# Patient Record
Sex: MALE | Race: WHITE | NOT HISPANIC OR LATINO | Employment: OTHER | URBAN - NONMETROPOLITAN AREA
[De-identification: names, ages, dates, MRNs, and addresses within clinical notes are randomized per-mention and may not be internally consistent; named-entity substitution may affect disease eponyms.]

---

## 2017-01-02 ENCOUNTER — AMBULATORY - GICH (OUTPATIENT)
Dept: UROLOGY | Facility: OTHER | Age: 37
End: 2017-01-02

## 2017-01-02 DIAGNOSIS — E29.1 TESTICULAR HYPOFUNCTION: ICD-10-CM

## 2017-01-26 ENCOUNTER — OFFICE VISIT - GICH (OUTPATIENT)
Dept: UROLOGY | Facility: OTHER | Age: 37
End: 2017-01-26

## 2017-01-26 ENCOUNTER — HISTORY (OUTPATIENT)
Dept: UROLOGY | Facility: OTHER | Age: 37
End: 2017-01-26

## 2017-01-26 DIAGNOSIS — E29.1 TESTICULAR HYPOFUNCTION: ICD-10-CM

## 2017-02-07 ENCOUNTER — COMMUNICATION - GICH (OUTPATIENT)
Dept: FAMILY MEDICINE | Facility: OTHER | Age: 37
End: 2017-02-07

## 2017-03-03 ENCOUNTER — OFFICE VISIT - GICH (OUTPATIENT)
Dept: FAMILY MEDICINE | Facility: OTHER | Age: 37
End: 2017-03-03

## 2017-03-03 ENCOUNTER — HISTORY (OUTPATIENT)
Dept: FAMILY MEDICINE | Facility: OTHER | Age: 37
End: 2017-03-03

## 2017-03-03 DIAGNOSIS — R10.30 LOWER ABDOMINAL PAIN: ICD-10-CM

## 2017-03-03 DIAGNOSIS — J01.00 ACUTE MAXILLARY SINUSITIS: ICD-10-CM

## 2017-03-03 ASSESSMENT — PATIENT HEALTH QUESTIONNAIRE - PHQ9: SUM OF ALL RESPONSES TO PHQ QUESTIONS 1-9: 0

## 2017-03-13 ENCOUNTER — AMBULATORY - GICH (OUTPATIENT)
Dept: UROLOGY | Facility: OTHER | Age: 37
End: 2017-03-13

## 2017-03-13 DIAGNOSIS — E29.1 TESTICULAR HYPOFUNCTION: ICD-10-CM

## 2017-03-27 ENCOUNTER — HISTORY (OUTPATIENT)
Dept: EMERGENCY MEDICINE | Facility: OTHER | Age: 37
End: 2017-03-27

## 2017-04-06 ENCOUNTER — HISTORY (OUTPATIENT)
Dept: UROLOGY | Facility: OTHER | Age: 37
End: 2017-04-06

## 2017-04-06 ENCOUNTER — OFFICE VISIT - GICH (OUTPATIENT)
Dept: UROLOGY | Facility: OTHER | Age: 37
End: 2017-04-06

## 2017-04-06 DIAGNOSIS — E29.1 TESTICULAR HYPOFUNCTION: ICD-10-CM

## 2017-04-13 ENCOUNTER — AMBULATORY - GICH (OUTPATIENT)
Dept: UROLOGY | Facility: OTHER | Age: 37
End: 2017-04-13

## 2017-04-13 DIAGNOSIS — E29.1 TESTICULAR HYPOFUNCTION: ICD-10-CM

## 2017-06-15 ENCOUNTER — OFFICE VISIT - GICH (OUTPATIENT)
Dept: UROLOGY | Facility: OTHER | Age: 37
End: 2017-06-15

## 2017-06-15 ENCOUNTER — HISTORY (OUTPATIENT)
Dept: UROLOGY | Facility: OTHER | Age: 37
End: 2017-06-15

## 2017-06-15 DIAGNOSIS — E29.1 TESTICULAR HYPOFUNCTION: ICD-10-CM

## 2017-07-25 ENCOUNTER — AMBULATORY - GICH (OUTPATIENT)
Dept: UROLOGY | Facility: OTHER | Age: 37
End: 2017-07-25

## 2017-07-25 DIAGNOSIS — E29.1 TESTICULAR HYPOFUNCTION: ICD-10-CM

## 2017-08-10 ENCOUNTER — AMBULATORY - GICH (OUTPATIENT)
Dept: LAB | Facility: OTHER | Age: 37
End: 2017-08-10

## 2017-08-10 DIAGNOSIS — E29.1 TESTICULAR HYPOFUNCTION: ICD-10-CM

## 2017-08-10 LAB
HCT VFR BLD AUTO: 50.3 % (ref 37–53)
TESTOST SERPL-MCNC: 250.3 NG/DL (ref 175–781)

## 2017-08-24 ENCOUNTER — AMBULATORY - GICH (OUTPATIENT)
Dept: SCHEDULING | Facility: OTHER | Age: 37
End: 2017-08-24

## 2017-08-30 ENCOUNTER — HISTORY (OUTPATIENT)
Dept: UROLOGY | Facility: OTHER | Age: 37
End: 2017-08-30

## 2017-08-30 ENCOUNTER — OFFICE VISIT - GICH (OUTPATIENT)
Dept: UROLOGY | Facility: OTHER | Age: 37
End: 2017-08-30

## 2017-08-30 DIAGNOSIS — E29.1 TESTICULAR HYPOFUNCTION: ICD-10-CM

## 2017-09-22 ENCOUNTER — OFFICE VISIT - GICH (OUTPATIENT)
Dept: FAMILY MEDICINE | Facility: OTHER | Age: 37
End: 2017-09-22

## 2017-09-22 ENCOUNTER — HISTORY (OUTPATIENT)
Dept: FAMILY MEDICINE | Facility: OTHER | Age: 37
End: 2017-09-22

## 2017-09-22 DIAGNOSIS — H60.92 OTITIS EXTERNA OF LEFT EAR: ICD-10-CM

## 2017-09-22 ASSESSMENT — PATIENT HEALTH QUESTIONNAIRE - PHQ9: SUM OF ALL RESPONSES TO PHQ QUESTIONS 1-9: 0

## 2017-10-12 ENCOUNTER — AMBULATORY - GICH (OUTPATIENT)
Dept: UROLOGY | Facility: OTHER | Age: 37
End: 2017-10-12

## 2017-10-12 DIAGNOSIS — E29.1 TESTICULAR HYPOFUNCTION: ICD-10-CM

## 2017-11-09 ENCOUNTER — HISTORY (OUTPATIENT)
Dept: UROLOGY | Facility: OTHER | Age: 37
End: 2017-11-09

## 2017-11-09 ENCOUNTER — OFFICE VISIT - GICH (OUTPATIENT)
Dept: UROLOGY | Facility: OTHER | Age: 37
End: 2017-11-09

## 2017-11-09 DIAGNOSIS — E29.1 TESTICULAR HYPOFUNCTION: ICD-10-CM

## 2017-11-09 LAB — TESTOST SERPL-MCNC: 181.1 NG/DL (ref 175–781)

## 2017-12-11 ENCOUNTER — AMBULATORY - GICH (OUTPATIENT)
Dept: UROLOGY | Facility: OTHER | Age: 37
End: 2017-12-11

## 2017-12-11 DIAGNOSIS — E29.1 TESTICULAR HYPOFUNCTION: ICD-10-CM

## 2017-12-15 ENCOUNTER — OFFICE VISIT - GICH (OUTPATIENT)
Dept: FAMILY MEDICINE | Facility: OTHER | Age: 37
End: 2017-12-15

## 2017-12-15 ENCOUNTER — HISTORY (OUTPATIENT)
Dept: FAMILY MEDICINE | Facility: OTHER | Age: 37
End: 2017-12-15

## 2017-12-15 DIAGNOSIS — J02.0 STREPTOCOCCAL PHARYNGITIS: ICD-10-CM

## 2017-12-15 DIAGNOSIS — J02.9 ACUTE PHARYNGITIS: ICD-10-CM

## 2017-12-15 LAB — STREP A ANTIGEN - HISTORICAL: POSITIVE

## 2017-12-27 NOTE — PROGRESS NOTES
Patient Information     Patient Name MRN Julio Guzman [id number] Male 1980      Progress Notes by Raz Cabrera MD at 6/15/2017  8:45 AM     Author:  Raz Cabrera MD Service:  (none) Author Type:  Physician     Filed:  6/15/2017  9:44 AM Encounter Date:  6/15/2017 Status:  Signed     :  Raz Cabrera MD (Physician)            IType of Visit  EST    Chief Complaint  Hypogonadism    HPI  Mr. Trent is a 36 y.o. male who follows up with hypogonadism.  Primary symptoms include low energy which started 3 years ago.  Has previously was on short acting T injections.  He was on 200mg every 2 weeks but was titrated up to 200mg every 2-3 days.  His levels were significantly supratherapeutic.  About three years ago he was using Axiron.  He presents today for his fourth injection of Aveed.    Overall he feels well.  He is complaining of difficulty with weight loss.  Also complaining of significant weight gain the last 2-3 years.  He is asking for medications to help with weight loss and cut fat.  He denies breast swelling or tenderness and acne.      Review of Systems  I personally reviewed the ROS with the patient.    Nursing Notes:   Erinn Glynn  6/15/2017  9:05 AM  Signed  Patient presents to the clinic for follow up and Aveed injection.  Erinn Glynn LPN........................6/15/2017  8:56 AM    Review of Systems:    Weight loss:    No     Recent fever/chills:  No   Night sweats:   No  Current skin rash:  No   Recent hair loss:  No  Heat intolerance:  No   Cold intolerance:  yes  Chest pain:   No   Palpitations:   No  Shortness of breath:  No   Wheezing:   No  Constipation:    No   Diarrhea:   No   Nausea:   No   Vomiting:   No   Kidney/side pain:  No   Back pain:   No  Frequent headaches:  No   Dizziness:     No  Leg swelling:   No   Calf pain:    No    Erinn Glynn LPN........................6/15/2017  8:56 AM        Physical Exam  Vitals:     06/15/17 0857   BP: 146/74   Pulse: 80    Weight: 108.9 kg (240 lb)      Constitutional: No acute distress.  Alert and cooperative   Head: NCAT  Eyes: Conjunctivae normal  Cardiovascular: Regular rate.  Pulmonary/Chest: Respirations are even and non-labored bilaterally, no audible wheezing  Abdominal: Soft. No distension, tenderness, masses or guarding.   Neurological: A + O x 3.  Cranial Nerves II-XII grossly intact.  Extremities: BEST x 4, Warm. No clubbing.  No cyanosis.    Skin: Pink, warm and dry.  No visible rashes noted.  Psychiatric:  Normal mood and affect  Back:  No left CVA tenderness.  No right CVA tenderness.  Genitourinary:  Nonpalpable bladder    Labs  Results for KENNETH TRENT (MRN [id number]) as of 12/19/2016 08:35   2/3/2016 17:06 12/14/2016 16:27   TSH 1.27    VITAMIN D TOTAL AFL 21.2    TESTOSTERONE FREE 74.00 (H) 4.49 (L)   TESTOSTERONE,TOTAL 2000 (H) 136 (L)       CREATININE (mg/dL)    Date Value   12/14/2016 1.09       Testosterone Injection  Today the patient received an injection of testosterone undecanoate 750mg.  This was given in the gluteus.  The results of this injection: None  Patient was monitored for 30 minutes following the injection.    Assessment  Mr. Trent is a 36 y.o. male with clinically symptomatic hypogonadism.  He is continuing Aveed.  He accidentally missed his lab appointment two weeks ago- Will need to reschedule    Plan  Aveed 750mg IM every 10 weeks   -Will again plan for labs (T and HCT) in 8 weeks (lab only)

## 2017-12-28 NOTE — PROGRESS NOTES
Patient Information     Patient Name MRN Julio Guzman [id number] Male 1980      Progress Notes by Raz Cabrera MD at 2017  8:45 AM     Author:  Raz Cabrera MD Service:  (none) Author Type:  Physician     Filed:  2017 12:56 PM Encounter Date:  2017 Status:  Signed     :  Raz Cabrera MD (Physician)            IType of Visit  EST    Chief Complaint  Hypogonadism    HPI  Mr. Trent is a 37 y.o. male who follows up with hypogonadism.  Primary symptoms include low energy which started 3 years ago.  Has previously was on short acting T injections.  He was on 200mg every 2 weeks but was titrated up to 200mg every 2-3 days.  His levels were significantly supratherapeutic.  He also previously failed Axiron.  He presents today for an injection of Aveed.  He is doing well with this form of replacement for the first 7-8 weeks of the 10 week course.  He does complain of significant return of symptoms of low energy and low libido the last 2-3 weeks of the cycle.  He denies side effects such as acne or mood swings.      Review of Systems  I personally reviewed the ROS with the patient.    Nursing Notes:   Erinn Glynn  2017  9:14 AM  Signed  Patient presents to the clinic for follow up aveed injection.  Erinn Glynn LPN........................2017  8:56 AM    Review of Systems:    Weight loss:    No     Recent fever/chills:  No   Night sweats:   No  Current skin rash:  No   Recent hair loss:  No  Heat intolerance:  yes   Cold intolerance:  yes  Chest pain:   No   Palpitations:   No  Shortness of breath:  No   Wheezing:   No  Constipation:    No   Diarrhea:   No   Nausea:   No   Vomiting:   No   Kidney/side pain:  No   Back pain:   No  Frequent headaches:  No   Dizziness:     No  Leg swelling:   yes   Calf pain:    No        Physical Exam  Vitals:     17 0857   BP: 126/64   Pulse: 72   Weight: 107.4 kg (236 lb 12.8 oz)      Constitutional: No acute distress.  Alert  and cooperative   Head: NCAT  Eyes: Conjunctivae normal  Cardiovascular: Regular rate.  Pulmonary/Chest: Respirations are even and non-labored bilaterally, no audible wheezing  Abdominal: Soft. No distension, tenderness, masses or guarding.   Neurological: A + O x 3.  Cranial Nerves II-XII grossly intact.  Extremities: BEST x 4, Warm. No clubbing.  No cyanosis.    Skin: Pink, warm and dry.  No visible rashes noted.  Psychiatric:  Normal mood and affect  Back:  No left CVA tenderness.  No right CVA tenderness.  Genitourinary:  Nonpalpable bladder    Labs  Results for KENNETH TRENT (MRN [id number]) as of 12/19/2016 08:35   2/3/2016 17:06 12/14/2016 16:27   TSH 1.27    VITAMIN D TOTAL AFL 21.2    TESTOSTERONE FREE 74.00 (H) 4.49 (L)   TESTOSTERONE,TOTAL 2000 (H) 136 (L)     Results for KENNETH TRENT (MRN [id number]) as of 8/30/2017 09:18   8/10/2017 08:17   HEMATOCRIT                50.3   TESTOSTERONE,TOTAL 250.3     CREATININE (mg/dL)    Date Value   12/14/2016 1.09       Testosterone Injection  Today the patient received an injection of testosterone undecanoate 750mg.  This was given in the gluteus.  The results of this injection: None  Patient was monitored for 30 minutes following the injection.    Assessment  Mr. Trent is a 37 y.o. male with clinically symptomatic hypogonadism.  He is continuing Aveed.  He accidentally missed his lab appointment two weeks ago- Will need to reschedule    Plan  Aveed 750mg IM every 10 weeks   -Will again plan for labs (T and HCT) in 18 weeks (lab only)

## 2017-12-28 NOTE — PROGRESS NOTES
"Patient Information     Patient Name MRN Sex Julio Morton [id number] Male 1980      Progress Notes by Samantha Gonzalez MD at 2017  3:00 PM     Author:  Samantha Gonzalez MD Service:  (none) Author Type:  Physician     Filed:  2017  5:00 PM Encounter Date:  2017 Status:  Signed     :  Samantha Gonzalez MD (Physician)            SUBJECTIVE:    Julio Trent is a 37 y.o. male who presents for ear pain.    HPI Comments: Patient presents with several days of left ear pain. Pain is quite severe. Last week he had a few days of right ear pain that then resolved. He has been swimming more often with his kids. He currently feels mildly nauseous. No fever but does have some neck discomfort, generally just does not feel well.    REVIEW OF SYSTEMS:  ROS see history of present illness, review of systems otherwise negative.    OBJECTIVE:  /80  Pulse 72  Temp 97.7  F (36.5  C) (Tympanic)  Ht 1.8 m (5' 10.87\")  Wt 102.5 kg (226 lb)  BMI 31.64 kg/m2    EXAM:   Physical Exam   Constitutional: He is well-developed, well-nourished, and in no distress.   HENT:   Left canal red and swollen and irritated, TM that can be visualized appears normal. Right TM is normal. No cervical lymphadenopathy. Patient does have some discomfort with palpation over the left anterior cervical lymph nodes and submandibular area. No masses felt in this area. No nuchal rigidity.   Eyes: Conjunctivae are normal.   Neck: Neck supple.   Cardiovascular: Normal rate and regular rhythm.    Pulmonary/Chest: Effort normal and breath sounds normal.   Abdominal: Soft. There is no tenderness.   Neurological: He is alert.   Skin: No rash noted.   Psychiatric: Mood normal.       ASSESSMENT/PLAN:    ICD-10-CM    1. Otitis externa of left ear, unspecified chronicity, unspecified type H60.92 ciprofloxacin-dexamethasone (CIPRODEX) otic suspension      clindamycin (CLEOCIN) 300 mg capsule        Plan:  Discussed with patient that current " findings are most consistent with otitis externa. Due to recurrent issues recently, would recommend treatment with drying drops following swimming in the future. For now will treat with ciprofloxacin-dexamethasone otic suspension. Otitis externa, however, does not explain his other more systemic symptoms. We did discuss the possibility of an early dental or other infection which may be causing him to feel generally poor and cause nausea. The patient thinks some of this could be due to the recent diet that he is on, he has recently lost a significant amount of weight and has not been eating much. I opted to give him a prescription for clindamycin which he will hold onto for now. He will have this filled and start taking it if his other systemic symptoms progress, if he develops a fever, if he has a headache or other discomfort. If he has additional concerns she is encouraged to follow up over the weekend.      Samantha Gonzalez MD

## 2017-12-28 NOTE — PROGRESS NOTES
Patient Information     Patient Name MRN Julio Guzman [id number] Male 1980      Progress Notes by Raz Cabrera MD at 2017 11:30 AM     Author:  Raz Cabrera MD Service:  (none) Author Type:  Physician     Filed:  2017 12:43 PM Encounter Date:  2017 Status:  Signed     :  Raz Cabrera MD (Physician)            IType of Visit  EST    Chief Complaint  Hypogonadism    HPI  Mr. Trent is a 37 y.o. male who follows up with hypogonadism.  Primary symptoms include low energy which started over 3 years ago.  Has previously was on short acting T injections.  He was on 200mg every 2 weeks but was titrated up to 200mg every 2-3 days.  His levels were significantly supratherapeutic.  He also previously failed Axiron.  He presents today for an injection of Aveed.  He again states that he does exceptionally well for about 7-8 weeks out of the 10 week cycle.  The final 2 weeks he describes significant issues with low energy and low libido.  He is very satisfied with the first 8 weeks of symptomatic improvement.    He again denies side effects such as acne or mood swings.  He is asking if he can change the frequency of injection from every 10 weeks to every 8 weeks.      Review of Systems  I personally reviewed the ROS with the patient.    Nursing Notes:   Cadnie Barragan  2017 11:38 AM  Signed  Here for Aveed injection.  Review of Systems:    Weight loss:    Yes     Recent fever/chills:  No   Night sweats:   No  Current skin rash:  No   Recent hair loss:  No  Heat intolerance:  No   Cold intolerance:  Yes  Chest pain:   No   Palpitations:   No  Shortness of breath:  No   Wheezing:   No  Constipation:    No   Diarrhea:   No   Nausea:   No   Vomiting:   No   Kidney/side pain:  No   Back pain:   No  Frequent headaches:  No   Dizziness:     No  Leg swelling:   No   Calf pain:    No      Parents, brothers or sisters with history of kidney cancer?   No  Parents, brothers or sisters with  history of bladder cancer: No  Candie Barragan LIZAN  11/9/2017  11:24 AM            Physical Exam  Vitals:     11/09/17 1122   BP: 110/60   Resp: 16   Weight: 99.3 kg (219 lb)      Constitutional: No acute distress.  Alert and cooperative   Head: NCAT  Eyes: Conjunctivae normal  Cardiovascular: Regular rate.  Pulmonary/Chest: Respirations are even and non-labored bilaterally, no audible wheezing  Abdominal: Soft. No distension, tenderness, masses or guarding.   Neurological: A + O x 3.  Cranial Nerves II-XII grossly intact.  Extremities: BEST x 4, Warm. No clubbing.  No cyanosis.    Skin: Pink, warm and dry.  No visible rashes noted.  Psychiatric:  Normal mood and affect  Back:  No left CVA tenderness.  No right CVA tenderness.  Genitourinary:  Nonpalpable bladder    LabsResults for KENNETH TRENT (MRN [id number]) as of 11/9/2017 12:39   8/10/2017 08:17 11/9/2017 11:40   TESTOSTERONE,TOTAL 250.3 181.1       Results for KENNETH TRENT (MRN [id number]) as of 12/19/2016 08:35   2/3/2016 17:06 12/14/2016 16:27   TSH 1.27    VITAMIN D TOTAL AFL 21.2    TESTOSTERONE FREE 74.00 (H) 4.49 (L)   TESTOSTERONE,TOTAL 2000 (H) 136 (L)       CREATININE (mg/dL)    Date Value   12/14/2016 1.09       Testosterone Injection  Today the patient received an injection of testosterone undecanoate 750mg.  This was given in the gluteus.  The results of this injection: None  Patient was monitored for 30 minutes following the injection.    Assessment  Mr. Trent is a 37 y.o. male with clinically symptomatic hypogonadism.  He is doing very well with AVEED however he is having issues with the final 2 weeks of the 10 week course.  His labs today would support the fact that he is subtherapeutic the final 2 weeks both based on serum levels and his clinical symptoms.  I explained that changing the frequency to every 8 weeks would be quite reasonable and that we will attempt this for the next injection.    Plan  Aveed 750mg IM every 8 weeks (need to  prior auth this change in frequency)

## 2017-12-30 NOTE — NURSING NOTE
Patient Information     Patient Name MRN Julio Guzman [id number] Male 1980      Nursing Note by Salina Fuller at 2017  3:00 PM     Author:  Salina Fuller Service:  (none) Author Type:  (none)     Filed:  2017  3:07 PM Encounter Date:  2017 Status:  Signed     :  Salina Fullercheng Trent is a 37 y.o. male presenting with left ear pain.   Salina Fuller LPN 2017 3:00 PM

## 2017-12-30 NOTE — NURSING NOTE
Patient Information     Patient Name MRN Julio Guzman [id number] Male 1980      Nursing Note by Erinn Glynn at 2017  8:45 AM     Author:  Erinn Glynn Service:  (none) Author Type:  (none)     Filed:  2017  9:14 AM Encounter Date:  2017 Status:  Signed     :  Erinn Glynn            Patient presents to the clinic for follow up aveed injection.  Erinn Glynn LPN........................2017  8:56 AM    Review of Systems:    Weight loss:    No     Recent fever/chills:  No   Night sweats:   No  Current skin rash:  No   Recent hair loss:  No  Heat intolerance:  yes   Cold intolerance:  yes  Chest pain:   No   Palpitations:   No  Shortness of breath:  No   Wheezing:   No  Constipation:    No   Diarrhea:   No   Nausea:   No   Vomiting:   No   Kidney/side pain:  No   Back pain:   No  Frequent headaches:  No   Dizziness:     No  Leg swelling:   yes   Calf pain:    No

## 2017-12-30 NOTE — NURSING NOTE
Patient Information     Patient Name MRN Julio Guzman [id number] Male 1980      Nursing Note by Erinn Glynn at 6/15/2017  8:45 AM     Author:  Erinn Glynn Service:  (none) Author Type:  (none)     Filed:  6/15/2017  9:05 AM Encounter Date:  6/15/2017 Status:  Signed     :  Erinn Glynn            Patient presents to the clinic for follow up and Aveed injection.  Erinn Glynn LPN........................6/15/2017  8:56 AM    Review of Systems:    Weight loss:    No     Recent fever/chills:  No   Night sweats:   No  Current skin rash:  No   Recent hair loss:  No  Heat intolerance:  No   Cold intolerance:  yes  Chest pain:   No   Palpitations:   No  Shortness of breath:  No   Wheezing:   No  Constipation:    No   Diarrhea:   No   Nausea:   No   Vomiting:   No   Kidney/side pain:  No   Back pain:   No  Frequent headaches:  No   Dizziness:     No  Leg swelling:   No   Calf pain:    No    Erinn Glynn LPN........................6/15/2017  8:56 AM

## 2017-12-30 NOTE — NURSING NOTE
Patient Information     Patient Name MRN Julio Guzman [id number] Male 1980      Nursing Note by Candie Barragan at 2017 11:30 AM     Author:  Candie Barragan Service:  (none) Author Type:  (none)     Filed:  2017 11:38 AM Encounter Date:  2017 Status:  Signed     :  Candie Barragan            Here for Aveed injection.  Review of Systems:    Weight loss:    Yes     Recent fever/chills:  No   Night sweats:   No  Current skin rash:  No   Recent hair loss:  No  Heat intolerance:  No   Cold intolerance:  Yes  Chest pain:   No   Palpitations:   No  Shortness of breath:  No   Wheezing:   No  Constipation:    No   Diarrhea:   No   Nausea:   No   Vomiting:   No   Kidney/side pain:  No   Back pain:   No  Frequent headaches:  No   Dizziness:     No  Leg swelling:   No   Calf pain:    No      Parents, brothers or sisters with history of kidney cancer?   No  Parents, brothers or sisters with history of bladder cancer: No  Candie Barragan LPN  2017  11:24 AM

## 2017-12-30 NOTE — NURSING NOTE
"Patient Information     Patient Name MRN Julio Guzman [id number] Male 1980      Nursing Note by Vida Huerta RN at 6/15/2017  8:45 AM     Author:  Vida Huerta RN Service:  (none) Author Type:  NURS- Registered Nurse     Filed:  6/15/2017 10:05 AM Encounter Date:  6/15/2017 Status:  Signed     :  Vida Huerta RN (NURS- Registered Nurse)            Patient given \"Aveed Treatment: A Patient Guide\" form.  Injection given and patient in clinic for 30 minutes after injection for monitoring.  Patient tolerated injection with no problems.  Vida Huerta RN.........6/15/2017...10:05 AM        "

## 2018-01-03 NOTE — NURSING NOTE
Patient Information     Patient Name MRN Julio Guzman [id number] Male 1980      Nursing Note by Erinn Glynn at 3/3/2017  4:00 PM     Author:  Erinn Glynn Service:  (none) Author Type:  (none)     Filed:  3/3/2017  3:36 PM Encounter Date:  3/3/2017 Status:  Signed     :  Erinn Glynn            Patient presents to the clinic for sore throat, sinus pain and pressure.  Patient states that this has been going on for three weeks.    Erinn Glynn LPN........................3/3/2017  3:27 PM

## 2018-01-03 NOTE — TELEPHONE ENCOUNTER
Patient Information     Patient Name MRN Sex Julio Morton [id number] Male 1980      Telephone Encounter by Estee Joseph at 2017  2:41 PM     Author:  Estee Joseph Service:  (none) Author Type:  (none)     Filed:  2017  2:42 PM Encounter Date:  2017 Status:  Signed     :  Estee Joseph.  Do we need to contact the [place] [facility]??  This was forwarded to [facility].  Please advise.  Estee Joseph LPN ....................  2017   2:42 PM.

## 2018-01-03 NOTE — NURSING NOTE
Patient Information     Patient Name MRN Julio Guzman [id number] Male 1980      Nursing Note by Candie Barragan at 2017  8:30 AM     Author:  Candie Barragan Service:  (none) Author Type:  (none)     Filed:  2017  9:41 AM Encounter Date:  2017 Status:  Signed     :  Candie Barragan            Here for Aveed injection.  Review of Systems:    Weight loss:    Yes     Recent fever/chills:  No   Night sweats:   No  Current skin rash:  No   Recent hair loss:  No  Heat intolerance:  No   Cold intolerance:  Yes  Chest pain:   No   Palpitations:   No  Shortness of breath:  No   Wheezing:   No  Constipation:    No   Diarrhea:   Yes   Nausea:   No   Vomiting:   No   Kidney/side pain:  No   Back pain:   No  Frequent headaches:  No   Dizziness:     No  Leg swelling:   No   Calf pain:    No    Candie Barragan LPN  2017  8:42 AM

## 2018-01-03 NOTE — PATIENT INSTRUCTIONS
Patient Information     Patient Name MRN Julio Guzman [id number] Male 1980      Patient Instructions by Rosa Maria Davis PA-C at 3/3/2017  4:00 PM     Author:  Rosa Maria Davis PA-C Service:  (none) Author Type:  PHYS- Physician Assistant     Filed:  3/3/2017  3:52 PM Encounter Date:  3/3/2017 Status:  Signed     :  Rosa Maria Davis PA-C (PHYS- Physician Assistant)            Groin pain -   Encouraged to take ibuprofen for relief up to 4 times per day.  Encouraged rest and elevation.  Encouraged to use ice or heat 15 minutes at a time several times per day to decrease pain. Return to clinic in 1-2 weeks as necessary for persistent pain. Return to clinic with any change or worsening of symptoms.  Referred to PT.   Encouraged stretching.       Sinus infection - Antibiotic has been sent to pharmacy. Please take full course of antibiotic even if symptoms have completely resolved. This helps prevent against antibiotic resistance.     May use symptomatic care with tylenol or ibuprofen. May use cough syrup or cough drops.  Using a humidifier works well to break up the congestion. You can also sleep propped up on a couple pillows to decrease symptoms at night.     Use a Neti Pot/sinus flush (Melchor Med Sinus Rinse) 3 times daily to irrigate sinuses/mucosal tissue.     Sudafed or mucinex work well for congestion.   If you choose pseudoephedrine, use for only 5-7 days AS DIRECTED. Speak to your pharmacist if you have any concerns about your medications. May also use decongestant nasal spray, but only for 3 days MAXIMUM.    Please take tylenol as needed up to 4 times daily.  Treat symptomatically with warm salt water gargles.  Lozenges, Tylenol, Advil or Aleve as needed. Frequent swallows of cool liquid.  Oatmeal coats the throat and some patients find it soothes the pain.     Monitor for any fevers or chills. Return in 7-10 days if not feeling better. Please call clinic with any questions or concerns.  Please take in a lot of fluids and get rest.     You will need to be evaluated if you start to experience:  Fever higher than 102.5 F (39.2 C)   Sudden and severe pain in the face and head   Trouble seeing or seeing double   Trouble thinking clearly   Swelling or redness around 1 or both eyes   Trouble breathing or a stiff neck    * If you are a smoker, try to quit *    Call 9-1-1 or go to the emergency room if you:  Have trouble breathing   Are drooling because you cannot swallow your saliva   Have swelling of the neck or tongue   Cannot move your neck or have trouble opening your mouth

## 2018-01-03 NOTE — TELEPHONE ENCOUNTER
Patient Information     Patient Name MRN Julio Guzman [id number] Male 1980      Telephone Encounter by Elaina Collazo NP at 2017 10:48 AM     Author:  Elaina Collazo NP Service:  (none) Author Type:  PHYS- Nurse Practitioner     Filed:  2017 10:48 AM Encounter Date:  2017 Status:  Signed     :  Elaina Collazo NP (PHYS- Nurse Practitioner)            Spoke with Britt Martins at the [place] [facility] in regards to positive anaplasmosis diagnosis.

## 2018-01-03 NOTE — PROGRESS NOTES
Patient Information     Patient Name MRN Sex Julio Morton [id number] Male 1980      Progress Notes by Rosa Maria Davis PA-C at 3/3/2017  4:00 PM     Author:  Rosa Maria Davis PA-C Service:  (none) Author Type:  PHYS- Physician Assistant     Filed:  3/3/2017  3:55 PM Encounter Date:  3/3/2017 Status:  Signed     :  Rosa Maria Davis PA-C (PHYS- Physician Assistant)            Nursing Notes:   Erinn Glynn  3/3/2017  3:36 PM  Signed  Patient presents to the clinic for sore throat, sinus pain and pressure.  Patient states that this has been going on for three weeks.    Erinn Glynn LPN........................3/3/2017  3:27 PM      HPI:    Julio Trent is a 36 y.o. male who presents for sore throat, sinus pain and pressure.  Patient states that this has been going on for three weeks. Vomited a few weeks ago. Then got sinus pressure. Then wisdom tooth was bothering him. Infected, pulled. Then got pressure on sinuses. Took amoxicillin for a week with dental infection. Ear pain on left sinus.  NO further vomiting, diarrhea.  NO cough. Sore throat.      Left groin pain for over a year. No swelling, bruising, trauma. Tried chiropractor and taping. Did not help. Tx ibuprofen.     Past Medical History     Diagnosis  Date     Low testosterone 2/3/2016     Moderate anxiety 2/3/2016     Moderate major depression (HC) 2/3/2016       Past Surgical History       Procedure   Laterality Date     Pyloric stenosis        Knee arthroscopy        Bilateral mastectomy   Bilateral      due to gynecomastia         Social History       Substance Use Topics         Smoking status:   Former Smoker     Smokeless tobacco:   Former User     Alcohol use   0.0 oz/week     0 Standard drinks or equivalent per week        Comment: socially        Current Outpatient Prescriptions       Medication  Sig Dispense Refill     cholecalciferol (VITAMIN D3) 5,000 unit capsule Take 1 capsule by mouth once daily. For Vitamin D  Deficiency 100 capsule 3     metFORMIN (GLUCOPHAGE XR) 500 mg Extended-Release tablet Take 1-2 tablets by mouth once daily with a meal. - For diabetes prevention 180 tablet 0     pravastatin (PRAVACHOL) 80 mg tablet Take 1 tablet by mouth at bedtime. For Cholesterol and Heart Attack Risk Reduction 90 tablet 3     No current facility-administered medications for this visit.      Medications have been reviewed by me and are current to the best of my knowledge and ability.      Allergies     Allergen  Reactions     Metal [Iron] Rash       REVIEW OF SYSTEMS:  Refer to HPI.    EXAM:   Vitals:    /68  Pulse 92  Temp 97.9  F (36.6  C) (Tympanic)  Wt 109.2 kg (240 lb 12.8 oz)  BMI 33.58 kg/m2    General Appearance: Pleasant, alert, appropriate appearance for age. No acute distress  Ear Exam: Normal TM's bilaterally, grey, translucent, bony landmarks appreciated.   Left/Right TM: Effusion is not present. TM is not bulging. There is no pus appreciated.    Normal auditory canals and external ears. Non-tender.   Nose Exam: Normal external nose, mucus membranes, and septum.  OroPharynx Exam:  Erythematous posterior pharynx with no exudates. Sinus pain upon palpation of frontal, ethmoid, and maxillary sinuses.  Chest/Respiratory Exam: Normal chest wall and respirations. Clear to auscultation. No retractions appreciated.  Cardiovascular Exam: Regular rate and rhythm. S1, S2, no murmur, click, gallop, or rubs.  Lymphatic Exam: ACLN.  Skin: no rash or abnormalities  Psychiatric Exam: Alert and oriented - appropriate affect.    PHQ Depression Screen  Date of PHQ exam: 03/03/17  Over the last 2 weeks, how often have you been bothered by any of the following problems?  1. Little interest or pleasure in doing things: 0 - Not at all  2. Feeling down, depressed, or hopeless: 0 - Not at all  3. Trouble falling or staying asleep, or sleeping too much: 0 - Not at all  4. Feeling tired or having little energy: 0 - Not at all  5. Poor  appetite or overeatin - Not at all  6. Feeling bad about yourself - or that you are a failure or have let yourself or your family down: 0 - Not at all  7. Trouble concentrating on things, such as reading the newspaper or watching television: 0 - Not at all  8. Moving or speaking so slowly that other people could have noticed. Or the opposite - being so fidgety or restless that you have been moving around a lot more than usual: 0 - Not at all  9. Thoughts that you would be better off dead, or of hurting yourself in some way: 0 - Not at all    PHQ-9 TOTAL SCORE: 0  Depression Severity Level: none  If any answers were positive, how difficult have these problems made it for you to do your work, take care of things at home, or get along with other people: not applicable    LABS:    No results found for this visit on 17.    ASSESSMENT AND PLAN:      ICD-10-CM    1. Acute non-recurrent maxillary sinusitis J01.00 cefdinir (OMNICEF) 300 mg capsule   2. Left groin pain R10.30 AMB CONSULT TO PHYSICAL THERAPY       Groin pain -   Encouraged to take ibuprofen for relief up to 4 times per day.  Encouraged rest and elevation.  Encouraged to use ice or heat 15 minutes at a time several times per day to decrease pain. Return to clinic in 1-2 weeks as necessary for persistent pain. Return to clinic with any change or worsening of symptoms.  Referred to PT.   Encouraged stretching.       Sinus infection - Antibiotic has been sent to pharmacy.  Started on cefdinir.       Patient Instructions   Groin pain -   Encouraged to take ibuprofen for relief up to 4 times per day.  Encouraged rest and elevation.  Encouraged to use ice or heat 15 minutes at a time several times per day to decrease pain. Return to clinic in 1-2 weeks as necessary for persistent pain. Return to clinic with any change or worsening of symptoms.  Referred to PT.   Encouraged stretching.       Sinus infection - Antibiotic has been sent to pharmacy. Please take  full course of antibiotic even if symptoms have completely resolved. This helps prevent against antibiotic resistance.     May use symptomatic care with tylenol or ibuprofen. May use cough syrup or cough drops.  Using a humidifier works well to break up the congestion. You can also sleep propped up on a couple pillows to decrease symptoms at night.     Use a Neti Pot/sinus flush (Melchor Med Sinus Rinse) 3 times daily to irrigate sinuses/mucosal tissue.     Sudafed or mucinex work well for congestion.   If you choose pseudoephedrine, use for only 5-7 days AS DIRECTED. Speak to your pharmacist if you have any concerns about your medications. May also use decongestant nasal spray, but only for 3 days MAXIMUM.    Please take tylenol as needed up to 4 times daily.  Treat symptomatically with warm salt water gargles.  Lozenges, Tylenol, Advil or Aleve as needed. Frequent swallows of cool liquid.  Oatmeal coats the throat and some patients find it soothes the pain.     Monitor for any fevers or chills. Return in 7-10 days if not feeling better. Please call clinic with any questions or concerns. Please take in a lot of fluids and get rest.     You will need to be evaluated if you start to experience:  Fever higher than 102.5 F (39.2 C)   Sudden and severe pain in the face and head   Trouble seeing or seeing double   Trouble thinking clearly   Swelling or redness around 1 or both eyes   Trouble breathing or a stiff neck    * If you are a smoker, try to quit *    Call 9-1-1 or go to the emergency room if you:  Have trouble breathing   Are drooling because you cannot swallow your saliva   Have swelling of the neck or tongue   Cannot move your neck or have trouble opening your mouth        Rosa Maria Davis PA-C..................3/3/2017 3:37 PM

## 2018-01-03 NOTE — PROGRESS NOTES
Patient Information     Patient Name MRN Julio uGzman [id number] Male 1980      Progress Notes by Raz Cabrera MD at 2017  8:30 AM     Author:  Raz Cabrera MD Service:  (none) Author Type:  Physician     Filed:  2017  9:41 AM Encounter Date:  2017 Status:  Signed     :  Raz Cabrera MD (Physician)            IType of Visit  EST    Chief Complaint  Hypogonadism    HPI  Mr. Trent is a 36 y.o. male who follows up with low testosterone.  Primary symptoms include low energy which started 3 years ago.  Has previously was on short acting T injections.  He was on 200mg every 2 weeks but was titrated up to 200mg every 2-3 days.  His levels were significantly supratherapeutic.  About three years ago he was using Axiron.  No changes in symptoms today.  He presents today for his second injection of Aveed.      Review of Systems  I personally reviewed the ROS with the patient.    Nursing Notes:   Candie Barragan  2017  8:43 AM  Unsigned  Here for Aveed injection.  Review of Systems:    Weight loss:    Yes     Recent fever/chills:  No   Night sweats:   No  Current skin rash:  No   Recent hair loss:  No  Heat intolerance:  No   Cold intolerance:  Yes  Chest pain:   No   Palpitations:   No  Shortness of breath:  No   Wheezing:   No  Constipation:    No   Diarrhea:   Yes   Nausea:   No   Vomiting:   No   Kidney/side pain:  No   Back pain:   No  Frequent headaches:  No   Dizziness:     No  Leg swelling:   No   Calf pain:    No    Candie Barragan LPN  2017  8:42 AM              Physical Exam  Vitals:     17 0839   BP: 110/64   Resp: 14   Weight: 103.4 kg (228 lb)      Constitutional: No acute distress.  Alert and cooperative   Head: NCAT  Eyes: Conjunctivae normal  Cardiovascular: Regular rate.  Pulmonary/Chest: Respirations are even and non-labored bilaterally, no audible wheezing  Abdominal: Soft. No distension, tenderness, masses or guarding.   Neurological: A + O x  3.  Cranial Nerves II-XII grossly intact.  Extremities: BEST x 4, Warm. No clubbing.  No cyanosis.    Skin: Pink, warm and dry.  No visible rashes noted.  Psychiatric:  Normal mood and affect  Back:  No left CVA tenderness.  No right CVA tenderness.  Genitourinary:  Nonpalpable bladder    Labs  Results for KENNETH TRENT (MRN [id number]) as of 12/19/2016 08:35   2/3/2016 17:06 12/14/2016 16:27   TSH 1.27    VITAMIN D TOTAL AFL 21.2    TESTOSTERONE FREE 74.00 (H) 4.49 (L)   TESTOSTERONE,TOTAL 2000 (H) 136 (L)       CREATININE (mg/dL)    Date Value   12/14/2016 1.09       Testosterone Injection  Today the patient received an injection of testosterone undecanoate 750mg.  This was given in the gluteus.  The results of this injection: None  Patient was monitored for 30 minutes following the injection.    Assessment  Mr. Trent is a 36 y.o. male with clinically symptomatic hypogonadism.  He is continuing Aveed.    We had a very long conversation today regarding his overall health and recent psychiatric medications that have been prescribed.    Plan  Aveed 750mg IM every 10 weeks.   -Will plan for labs (T and HCT) 8 weeks following the third injection (lab only)          Visit time was 26 minutes, with more than half of that devoted to counseling regarding his recent changes in health and medications.

## 2018-01-04 ENCOUNTER — HISTORY (OUTPATIENT)
Dept: UROLOGY | Facility: OTHER | Age: 38
End: 2018-01-04

## 2018-01-04 ENCOUNTER — OFFICE VISIT - GICH (OUTPATIENT)
Dept: UROLOGY | Facility: OTHER | Age: 38
End: 2018-01-04

## 2018-01-04 DIAGNOSIS — E29.1 TESTICULAR HYPOFUNCTION: ICD-10-CM

## 2018-01-04 NOTE — PROGRESS NOTES
Patient Information     Patient Name MRN Julio Guzman [id number] Male 1980      Progress Notes by Raz Cabrera MD at 2017  8:00 AM     Author:  Raz Cabrera MD Service:  (none) Author Type:  Physician     Filed:  2017 10:15 AM Encounter Date:  2017 Status:  Signed     :  Raz Cabrera MD (Physician)            IType of Visit  EST    Chief Complaint  Hypogonadism    HPI  Mr. Trent is a 36 y.o. male who follows up with hypogonadism.  Primary symptoms include low energy which started 3 years ago.  Has previously was on short acting T injections.  He was on 200mg every 2 weeks but was titrated up to 200mg every 2-3 days.  His levels were significantly supratherapeutic.  About three years ago he was using Axiron.  He presents today for his third injection of Aveed.    He states he feels well overall and is satisfied with that therapy.  However, he does have 1 complaint regarding his libido not being as strong as it was previously with short-acting injection therapy.  He denies significant issues with penile rigidity.    He denies breast swelling or tenderness and acne.      Review of Systems  I personally reviewed the ROS with the patient.    Nursing Notes:   Candie Barragan  2017  8:10 AM  Signed  Here for 3rd Aveed injection.  Review of Systems:    Weight loss:    No     Recent fever/chills:  Yes   Night sweats:   No  Current skin rash:  No   Recent hair loss:  No  Heat intolerance:  No   Cold intolerance:  Yes  Chest pain:   No   Palpitations:   No  Shortness of breath:  Yes   Wheezing:   Yes  Constipation:    No   Diarrhea:   No   Nausea:   No   Vomiting:   No   Kidney/side pain:  No   Back pain:   Yes  Frequent headaches:  Yes   Dizziness:     No  Leg swelling:   No   Calf pain:    No      Candie Barragan LPN  2017  8:05 AM            Physical Exam  Vitals:     17 0802   BP: 118/72   Resp: 14   Weight: 103.9 kg (229 lb)      Constitutional: No acute  distress.  Alert and cooperative   Head: NCAT  Eyes: Conjunctivae normal  Cardiovascular: Regular rate.  Pulmonary/Chest: Respirations are even and non-labored bilaterally, no audible wheezing  Abdominal: Soft. No distension, tenderness, masses or guarding.   Neurological: A + O x 3.  Cranial Nerves II-XII grossly intact.  Extremities: BEST x 4, Warm. No clubbing.  No cyanosis.    Skin: Pink, warm and dry.  No visible rashes noted.  Psychiatric:  Normal mood and affect  Back:  No left CVA tenderness.  No right CVA tenderness.  Genitourinary:  Nonpalpable bladder    Labs  Results for KENNETH TRENT (MRN [id number]) as of 12/19/2016 08:35   2/3/2016 17:06 12/14/2016 16:27   TSH 1.27    VITAMIN D TOTAL AFL 21.2    TESTOSTERONE FREE 74.00 (H) 4.49 (L)   TESTOSTERONE,TOTAL 2000 (H) 136 (L)       CREATININE (mg/dL)    Date Value   12/14/2016 1.09       Testosterone Injection  Today the patient received an injection of testosterone undecanoate 750mg.  This was given in the gluteus.  The results of this injection: None  Patient was monitored for 30 minutes following the injection.    Assessment  Mr. Trent is a 36 y.o. male with clinically symptomatic hypogonadism.  He is continuing Aveed.  We again had a very long conversation today regarding his overall health and recent psychiatric medications that have been prescribed.    Plan  Aveed 750mg IM every 10 weeks   -Will plan for labs (T and HCT) in 8 weeks (lab only)

## 2018-01-26 VITALS
SYSTOLIC BLOOD PRESSURE: 110 MMHG | BODY MASS INDEX: 31.94 KG/M2 | WEIGHT: 226 LBS | SYSTOLIC BLOOD PRESSURE: 122 MMHG | DIASTOLIC BLOOD PRESSURE: 74 MMHG | DIASTOLIC BLOOD PRESSURE: 72 MMHG | WEIGHT: 229 LBS | WEIGHT: 236.8 LBS | WEIGHT: 219 LBS | BODY MASS INDEX: 33.03 KG/M2 | SYSTOLIC BLOOD PRESSURE: 146 MMHG | SYSTOLIC BLOOD PRESSURE: 118 MMHG | DIASTOLIC BLOOD PRESSURE: 80 MMHG | BODY MASS INDEX: 33.47 KG/M2 | RESPIRATION RATE: 16 BRPM | SYSTOLIC BLOOD PRESSURE: 126 MMHG | BODY MASS INDEX: 31.64 KG/M2 | HEART RATE: 72 BPM | WEIGHT: 240 LBS | DIASTOLIC BLOOD PRESSURE: 60 MMHG | HEART RATE: 72 BPM | BODY MASS INDEX: 30.66 KG/M2 | RESPIRATION RATE: 14 BRPM | HEIGHT: 71 IN | DIASTOLIC BLOOD PRESSURE: 64 MMHG | TEMPERATURE: 97.7 F | HEART RATE: 80 BPM

## 2018-01-26 VITALS
SYSTOLIC BLOOD PRESSURE: 128 MMHG | WEIGHT: 240.8 LBS | TEMPERATURE: 97.9 F | DIASTOLIC BLOOD PRESSURE: 68 MMHG | HEART RATE: 92 BPM

## 2018-01-26 VITALS
WEIGHT: 228 LBS | DIASTOLIC BLOOD PRESSURE: 64 MMHG | BODY MASS INDEX: 31.8 KG/M2 | SYSTOLIC BLOOD PRESSURE: 110 MMHG | RESPIRATION RATE: 14 BRPM

## 2018-02-02 ASSESSMENT — PATIENT HEALTH QUESTIONNAIRE - PHQ9
SUM OF ALL RESPONSES TO PHQ QUESTIONS 1-9: 0
SUM OF ALL RESPONSES TO PHQ QUESTIONS 1-9: 0

## 2018-02-06 ENCOUNTER — DOCUMENTATION ONLY (OUTPATIENT)
Dept: FAMILY MEDICINE | Facility: OTHER | Age: 38
End: 2018-02-06

## 2018-02-06 RX ORDER — CIPROFLOXACIN AND DEXAMETHASONE 3; 1 MG/ML; MG/ML
4 SUSPENSION/ DROPS AURICULAR (OTIC) 2 TIMES DAILY
COMMUNITY
Start: 2017-09-22 | End: 2018-05-29

## 2018-02-09 VITALS
WEIGHT: 218 LBS | BODY MASS INDEX: 30.52 KG/M2 | DIASTOLIC BLOOD PRESSURE: 70 MMHG | RESPIRATION RATE: 14 BRPM | SYSTOLIC BLOOD PRESSURE: 120 MMHG

## 2018-02-09 VITALS
SYSTOLIC BLOOD PRESSURE: 120 MMHG | WEIGHT: 217.13 LBS | TEMPERATURE: 98.6 F | HEIGHT: 71 IN | DIASTOLIC BLOOD PRESSURE: 84 MMHG | BODY MASS INDEX: 30.4 KG/M2 | HEART RATE: 88 BPM

## 2018-02-12 NOTE — PATIENT INSTRUCTIONS
Patient Information     Patient Name MRN Julio Guzman [id number] Male 1980      Patient Instructions by Jolie Angel NP at 12/15/2017  2:06 PM     Author:  Jolie Angel NP  Service:  (none) Author Type:  PHYS- Nurse Practitioner     Filed:  12/15/2017  2:06 PM  Encounter Date:  12/15/2017 Status:  Addendum     :  Jolie Angel NP (PHYS- Nurse Practitioner)        Related Notes: Original Note by Jolie Angel NP (PHYS- Nurse Practitioner) filed at 12/15/2017  2:06 PM               Index Telugu Related topics   Strep Throat   ________________________________________________________________________  KEY POINTS    Strep throat is an infection of the throat caused by bacteria called group A streptococcus. It is very contagious.    If your healthcare provider suspects you have strep, he or she may prescribe an antibiotic before you have all the results from the lab tests. This medicine may be taken as pills or given as a shot.    Follow the full treatment prescribed by your healthcare provider. If you are taking an antibiotic, take the medicine for as long as your healthcare provider prescribes, even if you feel better. If you stop taking the medicine too soon, you may not kill all of the bacteria and you may get sick again.    Wash your hands often and especially after using the restroom, coughing, sneezing, or blowing your nose. Also wash your hands before eating or touching your mouth, nose, or eyes.  ________________________________________________________________________  What is strep throat?  Strep throat is an infection of the throat caused by bacteria called group A streptococcus.  What is the cause?  Strep infections are very contagious. Strep is passed to others by sneezing, coughing, or touching something with the bacteria on it. Strep can be on surfaces such as toys, cups or plates, doorknobs, or telephones.  Strep throat is common in school-age children. Children under 2  years old and adults who are not exposed to children are much less likely to get strep throat. Strep is most common from November through April, but it can happen any time of year.  What are the symptoms?  Symptoms may include:    Severe sore throat and painful swallowing    Swollen, tender lymph nodes (glands) in the neck    Fever    Headaches    Fine red rash on trunk and arms    Muscle aches    Swollen, red tonsils with white patches  How is it diagnosed?  Your healthcare provider will ask about your symptoms and medical history and examine you. Usually you will have a rapid strep test. Your provider will rub a cotton swab against your tonsil or the back of your throat to get a sample for testing. The results may be available in an hour or less. In some cases, a swab is sent to the lab and tested, which is a more accurate test. If you have a bacterial infection, it may take 2 to 3 days to find out what kind of germ is causing it. Knowing what germ is causing the infection and which medicines work best to treat it helps your provider choose the right medicine.  How is it treated?  If your healthcare provider suspects you have strep, he or she may prescribe an antibiotic before you have all the results from the lab tests. This medicine may be taken as pills or given as a shot.  The symptoms of strep throat may go away as soon as 24 hours after you start treatment. The symptoms rarely last longer than 5 days.  It is very important to treat strep throat. Not getting treatment for strep throat or not taking all the medicine prescribed can lead to complications that may involve the heart and kidneys.  How can I take care of myself?  Follow the full treatment prescribed by your healthcare provider. In addition:    If you are taking an antibiotic, take the medicine for as long as your healthcare provider prescribes, even if you feel better. If you stop taking the medicine too soon, you may not kill all of the bacteria  and you may get sick again.    For a sore throat:    Make sure you drink enough liquids. Drink clear soup, cold drinks, and other clear, nutritious liquids. If eating hurts your throat, don't force yourself to eat solid food. When you are able to eat more foods, choose healthy food to give you strength and to help fight the infection.    Gargle with salt water. You can make your own by mixing 1/2 teaspoon salt with 1 cup of water.    Suck on lozenges or hard candy.    Don't talk a lot. Rest your voice.    Use a humidifier to put more moisture in the air. Avoid steam vaporizers because they can cause burns. Be sure to keep the humidifier clean, as recommended in the 's instructions. It's important to keep bacteria and mold from growing in the water container.    Put warm compresses on your neck.    Do not smoke. Do not breathe second-hand smoke.    If you have a fever, rest and limit your activities until the fever is gone.    Ask your healthcare provider if you can take acetaminophen, aspirin, or ibuprofen to reduce your fever and to relieve pain. Read the label and take as directed. Unless recommended by your healthcare provider, you should not take these medicines for more than 10 days.    Nonsteroidal anti-inflammatory medicines (NSAIDs), such as ibuprofen, naproxen, and aspirin, may cause stomach bleeding and other problems. These risks increase with age.    Acetaminophen may cause liver damage or other problems. Unless recommended by your provider, don't take more than 3000 milligrams (mg) in 24 hours. To make sure you don t take too much, check other medicines you take to see if they also contain acetaminophen. Ask your provider if you need to avoid drinking alcohol while taking this medicine.  Ask your provider:    How and when you will get your test results    How long it will take to recover    If there are activities you should avoid and when you can return to your normal activities    How to  take care of yourself at home    What symptoms or problems you should watch for and what to do if you have them  Make sure you know when you should come back for a checkup. Keep all appointments for provider visits or tests.  How can I help prevent strep throat?  The following suggestions may help you prevent spread of a strep infection to others.    Wash your hands with soap and running water often and especially after using the restroom, coughing, sneezing, or blowing your nose. Also wash your hands before eating or touching your mouth, nose, or eyes.    Avoid close contact with other people, especially kissing and hugging, until you have taken the antibiotic for 24 to 48 hours.    Use paper cups, or separate cups, and paper towels in bathrooms instead of shared drinking cups and hand towels.    Don t share food and eating utensils with others.    Be careful not to let your nose or mouth touch drinking fountains.  Developed by [employer].  Adult Advisor 2017.2 published by [employer].  Last modified: 2016-10-24  Last reviewed: 2016-09-28  This content is reviewed periodically and is subject to change as new health information becomes available. The information is intended to inform and educate and is not a replacement for medical evaluation, advice, diagnosis or treatment by a healthcare professional.  References   Adult Advisor 2017.2 Index    Copyright   2017 [employer], a division of [employer] All rights reserved.

## 2018-02-12 NOTE — NURSING NOTE
Patient Information     Patient Name MRN Julio Guzman [id number] Male 1980      Nursing Note by Candie Barragan at 2018  8:15 AM     Author:  Candie Barragan Service:  (none) Author Type:  (none)     Filed:  2018  8:44 AM Encounter Date:  2018 Status:  Signed     :  Candie Barragan            Here for Aveed injection.  Review of Systems:    Weight loss:    No     Recent fever/chills:  No   Night sweats:   No  Current skin rash:  No   Recent hair loss:  No  Heat intolerance:  No   Cold intolerance:  Yes  Chest pain:   No   Palpitations:   No  Shortness of breath:  No   Wheezing:   No  Constipation:    No   Diarrhea:   No   Nausea:   No   Vomiting:   No   Kidney/side pain:  No   Back pain:   Yes  Frequent headaches:  No   Dizziness:     No  Leg swelling:   No   Calf pain:    No      Candie Barragan LPN  2018  8:29 AM

## 2018-02-12 NOTE — PROGRESS NOTES
"Patient Information     Patient Name MRN Sex Julio Morton [id number] Male 1980      Progress Notes by Jolie Angel NP at 12/15/2017  1:30 PM     Author:  Jolie Angel NP Service:  (none) Author Type:  PHYS- Nurse Practitioner     Filed:  12/15/2017  6:29 PM Encounter Date:  12/15/2017 Status:  Signed     :  Jolie Angel NP (PHYS- Nurse Practitioner)            SUBJECTIVE:    Julio Trent is a 37 y.o. male who presents for 3 days of generalized myalgia, sore throat, otalgia and reports initially \"had a fever of 104\" which she reports resolved when he took to bath. Able to swallow oral fluids. Denies any nausea vomiting or diarrhea. Able to manage his own saliva.  No cough or congestion. 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    HPI    Allergies     Allergen  Reactions     Metal [Iron] Rash   ,   Family History      Problem  Relation Age of Onset     Diabetes Father      Hypertension Father      No Known Problems Mother      Skin cancer Sister      Anxiety disorder Sister    ,   Current Outpatient Prescriptions on File Prior to Visit       Medication  Sig Dispense Refill     ciprofloxacin-dexamethasone (CIPRODEX) otic suspension Place 4 Drops into left ear 2 times daily. 1 Bottle 0     [START ON 2018] testosterone undecanoate (AVEED) 750 mg/3 mL (250 mg/mL) soln Inject 3 mL intramuscular one time for 1 dose. 3 mL 0     Current Facility-Administered Medications on File Prior to Visit          Medication  Dose Route Frequency Provider Last Rate Last Dose     [START ON 2018] testosterone undecanoate 750 mg soln  750 mg Intra-Muscular one time Raz Cabrera MD       ,   Current Outpatient Prescriptions:      " azithromycin (ZITHROMAX Z-MARTA) 250 mg tablet, Take 500 mg (2 tabs) by mouth on day 1, then 250 mg (1 tab) daily for days 2-5., Disp: 6 tablet, Rfl: 0     ciprofloxacin-dexamethasone (CIPRODEX) otic suspension, Place 4 Drops into left ear 2 times daily., Disp: 1 Bottle, Rfl: 0     [START ON 1/4/2018] testosterone undecanoate (AVEED) 750 mg/3 mL (250 mg/mL) soln, Inject 3 mL intramuscular one time for 1 dose., Disp: 3 mL, Rfl: 0    Current Facility-Administered Medications:      [START ON 1/4/2018] testosterone undecanoate 750 mg soln, 750 mg, Intra-Muscular, one time, Raz Cabrera MD  Medications have been reviewed by me and are current to the best of my knowledge and ability.,   Past Medical History:     Diagnosis  Date     Low testosterone 2/3/2016     Moderate anxiety 2/3/2016     Moderate major depression (HC) 2/3/2016   ,   Patient Active Problem List      Diagnosis Date Noted     Hypogonadism male 12/19/2016     Bilateral carpal tunnel syndrome 12/14/2016     Vitamin D deficiency 12/14/2016     Decreased sex drive 12/14/2016     Malaise and fatigue 12/14/2016     Elevated random blood glucose level 12/14/2016     Myalgia 12/14/2016     Sleep difficulties -- falls asleep okay, wakes up during night 12/14/2016     Prediabetes - NEW Dx - 12/14/2016 - A1c 6.1% 12/14/2016     Mixed hyperlipidemia 12/14/2016     Moderate anxiety 02/03/2016     Moderate major depression (HC) 02/03/2016   ,   Past Surgical History:      Procedure  Laterality Date     bilateral MASTECTOMY  Bilateral 2005    due to gynecomastia       KNEE ARTHROSCOPY       pyloric stenosis      and   Social History       Substance Use Topics         Smoking status:   Former Smoker     Smokeless tobacco:   Former User     Alcohol use   0.0 oz/week     0 Standard drinks or equivalent per week        Comment: socially        REVIEW OF SYSTEMS:  Review of Systems   Constitutional: Positive for fever and malaise/fatigue.   HENT: Positive for sore throat.   "  Eyes: Negative.    Respiratory: Negative.    Cardiovascular: Positive for chest pain.   Gastrointestinal: Negative.    Genitourinary: Negative.    Musculoskeletal: Negative.    Skin: Negative.    Neurological: Negative.    Endo/Heme/Allergies: Negative.    Psychiatric/Behavioral: Negative.        OBJECTIVE:  /84 (Cuff Site: Right Arm, Position: Sitting, Cuff Size: Adult Regular)  Pulse 88  Temp 98.6  F (37  C) (Temporal)  Ht 1.8 m (5' 10.87\")  Wt 98.5 kg (217 lb 2 oz)  BMI 30.4 kg/m2    EXAM:   Physical Exam   Constitutional: He is oriented to person, place, and time and well-developed, well-nourished, and in no distress.   HENT:   Head: Normocephalic and atraumatic.   Posterior pharynx injected with tonsillar hypertrophy 2+, no pustules, uvula midline without edema   Eyes: Conjunctivae are normal.   Neck: Normal range of motion. Neck supple. No JVD present.   Cardiovascular: Normal rate, regular rhythm and normal heart sounds.  Exam reveals no gallop and no friction rub.    No murmur heard.  Pulmonary/Chest: Effort normal and breath sounds normal.   Musculoskeletal: Normal range of motion.   Lymphadenopathy:     He has cervical adenopathy.   Neurological: He is alert and oriented to person, place, and time. Gait normal.   Skin: Skin is warm and dry.   Psychiatric: Mood, memory, affect and judgment normal.   Nursing note and vitals reviewed.      ASSESSMENT/PLAN:    ICD-10-CM    1. Sore throat J02.9 RAPID STREP WITH REFLEX CULTURE      RAPID STREP WITH REFLEX CULTURE   2. Strep pharyngitis J02.0 azithromycin (ZITHROMAX Z-MARTA) 250 mg tablet      Results for orders placed or performed in visit on 12/15/17      RAPID STREP WITH REFLEX CULTURE      Result  Value Ref Range    STREP A ANTIGEN           Positive (A) Negative         Plan:  Will treat with azithromycin--- feels \"has used amoxicillin several times in the past and does not feel this will be effective\" for him    Declines any workup today for his " past history of chest pain--- encouraged to follow-up if recurs    Recommend Tylenol, fluids for hydration and over-the-counter lozenges for comfort      Discussed expected course and return to clinic if no improvement or if abrupt onset of worsening symptoms

## 2018-02-12 NOTE — NURSING NOTE
Patient Information     Patient Name MRN Julio Guzman [id number] Male 1980      Nursing Note by Tessy Mobley at 12/15/2017  1:30 PM     Author:  Tessy Mobley Service:  (none) Author Type:  (none)     Filed:  12/15/2017  1:57 PM Encounter Date:  12/15/2017 Status:  Signed     :  Tessy Mobley            Patient presents to clinic today for sore throat and right ear pain. He states he was a little sick last week and now for the past three days hasn't been feeling well.    Tessy Mobley LPN...................12/15/2017  1:33 PM

## 2018-02-12 NOTE — PROGRESS NOTES
Patient Information     Patient Name MRN Julio Guzman [id number] Male 1980      Progress Notes by Raz Cabrera MD at 2018  8:15 AM     Author:  Raz Cabrera MD Service:  (none) Author Type:  Physician     Filed:  2018 11:17 AM Encounter Date:  2018 Status:  Signed     :  Raz Cabrera MD (Physician)            IType of Visit  EST    Chief Complaint  Hypogonadism    HPI  Mr. Trent is a 37 y.o. male who follows up with hypogonadism.  Primary symptom of low energy started over 3 years ago.  He was previously on short acting injections however his levels were supratherapeutic and his symptoms were poorly managed.  He previously failed Axiron.  He was then started on Aveed about 1 year ago.  He has done quite well on Aveed however he was noticing significant drop in energy for the last 2 weeks of the 10 week cycle.  Today is the first injection after moving to 8 weeks cycles.  He denies side effects such as acne or mood swings.      Review of Systems  I personally reviewed the ROS with the patient.    Nursing Notes:   Candie Barragan  2018  8:44 AM  Signed  Here for Aveed injection.  Review of Systems:    Weight loss:    No     Recent fever/chills:  No   Night sweats:   No  Current skin rash:  No   Recent hair loss:  No  Heat intolerance:  No   Cold intolerance:  Yes  Chest pain:   No   Palpitations:   No  Shortness of breath:  No   Wheezing:   No  Constipation:    No   Diarrhea:   No   Nausea:   No   Vomiting:   No   Kidney/side pain:  No   Back pain:   Yes  Frequent headaches:  No   Dizziness:     No  Leg swelling:   No   Calf pain:    No      Candie Barragan LPN  2018  8:29 AM            Physical Exam  Vitals:     18 0826   BP: 120/70   Cuff Site: Left Arm   Position: Sitting   Cuff Size: Adult Large   Resp: 14   Weight: 98.9 kg (218 lb)      Constitutional: No acute distress.  Alert and cooperative   Head: NCAT  Eyes: Conjunctivae normal  Cardiovascular:  Regular rate.  Pulmonary/Chest: Respirations are even and non-labored bilaterally, no audible wheezing  Abdominal: Soft. No distension, tenderness, masses or guarding.   Neurological: A + O x 3.  Cranial Nerves II-XII grossly intact.  Extremities: BEST x 4, Warm. No clubbing.  No cyanosis.    Skin: Pink, warm and dry.  No visible rashes noted.  Psychiatric:  Normal mood and affect  Back:  No left CVA tenderness.  No right CVA tenderness.  Genitourinary:  Nonpalpable bladder    LabsResults for KENNETH TRENT (MRN [id number]) as of 11/9/2017 12:39   8/10/2017 08:17 11/9/2017 11:40   TESTOSTERONE,TOTAL 250.3 181.1       Results for KENNETH TRENT (MRN [id number]) as of 12/19/2016 08:35   2/3/2016 17:06 12/14/2016 16:27   TSH 1.27    VITAMIN D TOTAL AFL 21.2    TESTOSTERONE FREE 74.00 (H) 4.49 (L)   TESTOSTERONE,TOTAL 2000 (H) 136 (L)       CREATININE (mg/dL)    Date Value   12/14/2016 1.09       Testosterone Injection  Today the patient received an injection of testosterone undecanoate 750mg.  This was given in the gluteus.  The results of this injection: None  Patient was monitored for 30 minutes following the injection.    Assessment  Mr. Trent is a 37 y.o. male with clinically symptomatic hypogonadism.  The new frequency has improved his symptoms.    Plan  Aveed 750mg IM every 8 weeks (labs 6 weeks after the next injection)

## 2018-02-28 DIAGNOSIS — E29.1 HYPOGONADISM MALE: Primary | ICD-10-CM

## 2018-03-01 ENCOUNTER — OFFICE VISIT (OUTPATIENT)
Dept: UROLOGY | Facility: OTHER | Age: 38
End: 2018-03-01
Attending: UROLOGY
Payer: COMMERCIAL

## 2018-03-01 VITALS
WEIGHT: 225 LBS | HEIGHT: 71 IN | BODY MASS INDEX: 31.5 KG/M2 | SYSTOLIC BLOOD PRESSURE: 118 MMHG | DIASTOLIC BLOOD PRESSURE: 60 MMHG | RESPIRATION RATE: 16 BRPM

## 2018-03-01 DIAGNOSIS — E29.1 HYPOGONADISM MALE: ICD-10-CM

## 2018-03-01 PROCEDURE — 25000128 H RX IP 250 OP 636: Performed by: UROLOGY

## 2018-03-01 PROCEDURE — 99213 OFFICE O/P EST LOW 20 MIN: CPT | Performed by: UROLOGY

## 2018-03-01 PROCEDURE — 96372 THER/PROPH/DIAG INJ SC/IM: CPT | Performed by: UROLOGY

## 2018-03-01 RX ADMIN — TESTOSTERONE UNDECANOATE 750 MG: 250 INJECTION INTRAMUSCULAR at 11:59

## 2018-03-01 ASSESSMENT — PAIN SCALES - GENERAL: PAINLEVEL: NO PAIN (0)

## 2018-03-01 NOTE — MR AVS SNAPSHOT
After Visit Summary   3/1/2018    Julio Trent    MRN: [id number]           Patient Information     Date Of Birth          1980        Visit Information        Provider Department      3/1/2018 8:45 AM Raz Cabrera MD [facility]        Today's Diagnoses     Hypogonadism male           Follow-ups after your visit        Your next 10 appointments already scheduled     Apr 12, 2018  9:00 AM CDT   LAB with [facility] LAB   [facility]    [address] [facility] [id number] [phone]           Please do not eat 10-12 hours before your appointment if you are coming in fasting for labs on lipids, cholesterol, or glucose (sugar). This does not apply to pregnant women. Water, hot tea and black coffee (with nothing added) are okay. Do not drink other fluids, diet soda or chew gum.            Apr 26, 2018  8:45 AM CDT   Return Visit with Raz Cabrera MD   [facility]    [address]  [place] [id number] [phone]              Who to contact     If you have questions or need follow up information about today's clinic visit or your schedule please contact [facility] directly at [phone].  Normal or non-critical lab and imaging results will be communicated to you by [email or web address]hart, letter or phone within 4 business days after the clinic has received the results. If you do not hear from us within 7 days, please contact the clinic through [email or web address] or phone. If you have a critical or abnormal lab result, we will notify you by phone as soon as possible.  Submit refill requests through [employer] or call your pharmacy and they will forward the refill request to us. Please allow 3 business days for your refill to be completed.          Additional Information About Your Visit        [employer] Information     [employer] lets you send messages to  "your doctor, view your test results, renew your prescriptions, schedule appointments and more. To sign up, go to [email or web address] . Click on \"Log in\" on the left side of the screen, which will take you to the Welcome page. Then click on \"Sign up Now\" on the right side of the page.     You will be asked to enter the access code listed below, as well as some personal information. Please follow the directions to create your username and password.     Your access code is: [email or web address]  Expires: 2018 12:37 PM     Your access code will  in 90 days. If you need help or a new code, please call your [facility] clinic or [phone].        Care EveryWhere ID     This is your Care EveryWhere ID. This could be used by other organizations to access your [facility] medical records  [id number]        Your Vitals Were     Respirations Height BMI (Body Mass Index)             16 1.803 m (5' 11\") 31.38 kg/m2          Blood Pressure from Last 3 Encounters:   18 118/60   18 120/70   12/15/17 120/84    Weight from Last 3 Encounters:   18 102.1 kg (225 lb)   18 98.9 kg (218 lb)   12/15/17 98.5 kg (217 lb 2 oz)              Today, you had the following     No orders found for display       Primary Care Provider Office Phone # Fax #    Emanuel Mccrary [phone] [address] [facility] [address]        Equal Access to Services     [facility]: Hadii lise Giang, waaxda luqadaha, qaybta kaalmalik sun . [facility] [place] -[phone].    ATENCIÓN: Si habla español, tiene a ambrose disposición servicios gratuitos de asistencia lingüística. Llame al [phone].    We comply with applicable federal civil rights laws and [place] laws. We do not discriminate on the basis of race, color, national origin, age, disability, sex, sexual orientation, or gender identity.            Thank you!     Thank you for choosing [facility]  for your care. Our goal is always to provide you with excellent care. Hearing back from our patients is one way we can continue to improve our services. Please take a few minutes to complete the written survey that you may receive in the mail after your visit with us. Thank you!             Your Updated Medication List - Protect others around you: Learn how to safely use, store and throw away your medicines at www.disposemymeds.org.          This list is accurate as of 3/1/18 12:37 PM.  Always use your most recent med list.                   Brand Name Dispense Instructions for use Diagnosis    ciprofloxacin-dexamethasone otic suspension    CIPRODEX     Place 4 drops Into the left ear 2 times daily

## 2018-03-01 NOTE — PROGRESS NOTES
"Type of Visit  EST    Chief Complaint  Hypogonadism    HPI  Mr. Trent is a 37 y.o. male who follows up with hypogonadism.  Primary symptom of low energy started over 3 years ago.  He was previously on short acting injections however his levels were supratherapeutic and his symptoms were poorly managed.  He previously failed Axiron.  He was then started on Aveed over 1 year ago.  He has done quite well on Aveed however he was noticing significant drop in energy for the last 2 weeks of the 10 week cycle.  Because of this we changed his cycle 2 injections every 8 weeks.  Since starting this new schedule his symptoms have significantly improved and he no longer suffers the 2 week trough.  He denies side effects such as mood swings or acne.  His energy level has significantly improved.      Review of Systems  I personally reviewed the ROS with the patient.    Nursing Notes:   Candie Barragan LPN  3/1/2018 12:02 PM  Signed  Here for Aveed injection.  Review of Systems:    Weight loss:    No     Recent fever/chills:  No   Night sweats:   No  Current skin rash:  No   Recent hair loss:  No  Heat intolerance:  No   Cold intolerance:  No  Chest pain:   No   Palpitations:   No  Shortness of breath:  No   Wheezing:   No  Constipation:    No   Diarrhea:   No   Nausea:   No   Vomiting:   No   Kidney/side pain:  No   Back pain:   No  Frequent headaches:  No   Dizziness:     No  Leg swelling:   No   Calf pain:    No    Candie Barragan LPN on 3/1/2018 at 11:53 AM        Physical Exam  /60 (BP Location: Left arm, Patient Position: Sitting, Cuff Size: Adult Large)  Resp 16  Ht 1.803 m (5' 11\")  Wt 102.1 kg (225 lb)  BMI 31.38 kg/m2    Constitutional: No acute distress.  Alert and cooperative   Head: NCAT  Eyes: Conjunctivae normal  Cardiovascular: Regular rate.  Pulmonary/Chest: Respirations are even and non-labored bilaterally, no audible wheezing  Abdominal: Soft. No distension, tenderness, masses or guarding. "   Neurological: A + O x 3.  Cranial Nerves II-XII grossly intact.  Extremities: BEST x 4, Warm. No clubbing.  No cyanosis.    Skin: Pink, warm and dry.  No visible rashes noted.  Psychiatric:  Normal mood and affect  Back:  No left CVA tenderness.  No right CVA tenderness.  Genitourinary:  Nonpalpable bladder    LabsResults for KENNETH TRENT (MRN [id number]) as of 11/9/2017 12:39   8/10/2017 08:17 11/9/2017 11:40   TESTOSTERONE,TOTAL 250.3 181.1       Results for KENNETH TRENT (MRN [id number]) as of 12/19/2016 08:35   2/3/2016 17:06 12/14/2016 16:27   TSH 1.27    VITAMIN D TOTAL AFL 21.2    TESTOSTERONE FREE 74.00 (H) 4.49 (L)   TESTOSTERONE,TOTAL 2000 (H) 136 (L)       CREATININE (mg/dL)   Date Value   12/14/2016 1.09       Testosterone Injection  Today the patient received an injection of testosterone undecanoate 750mg.  This was given in the gluteus.  The results of this injection: None  Patient was monitored for 30 minutes following the injection.    Assessment  Mr. Trent is a 37 y.o. male with clinically symptomatic hypogonadism.  The new frequency has significantly improved his symptoms.  He is extremely satisfied with the new schedule.    Plan  Aveed 750mg IM every 8 weeks (labs in 6 weeks are planned)

## 2018-04-10 ENCOUNTER — TELEPHONE (OUTPATIENT)
Dept: LAB | Facility: OTHER | Age: 38
End: 2018-04-10

## 2018-04-10 DIAGNOSIS — E29.1 HYPOGONADISM MALE: Primary | ICD-10-CM

## 2018-04-10 NOTE — TELEPHONE ENCOUNTER
Orders for HCT and Testosterone placed for Dr. Cabrera to sign.  Candie Barragan LPN on 4/10/2018 at 11:48 AM

## 2018-04-12 DIAGNOSIS — E29.1 HYPOGONADISM MALE: ICD-10-CM

## 2018-04-12 LAB
HCT VFR BLD AUTO: 50.5 % (ref 40–53)
TESTOST SERPL-MCNC: 233 NG/DL (ref 175–781)

## 2018-04-12 PROCEDURE — 36415 COLL VENOUS BLD VENIPUNCTURE: CPT | Performed by: UROLOGY

## 2018-04-12 PROCEDURE — 85014 HEMATOCRIT: CPT | Performed by: UROLOGY

## 2018-04-12 PROCEDURE — 84403 ASSAY OF TOTAL TESTOSTERONE: CPT | Performed by: UROLOGY

## 2018-04-26 ENCOUNTER — OFFICE VISIT (OUTPATIENT)
Dept: UROLOGY | Facility: OTHER | Age: 38
End: 2018-04-26
Attending: UROLOGY
Payer: COMMERCIAL

## 2018-04-26 VITALS — HEART RATE: 80 BPM | RESPIRATION RATE: 16 BRPM | BODY MASS INDEX: 32.64 KG/M2 | WEIGHT: 234 LBS

## 2018-04-26 DIAGNOSIS — E29.1 HYPOGONADISM MALE: Primary | ICD-10-CM

## 2018-04-26 PROCEDURE — 96372 THER/PROPH/DIAG INJ SC/IM: CPT

## 2018-04-26 PROCEDURE — 25000128 H RX IP 250 OP 636: Performed by: UROLOGY

## 2018-04-26 PROCEDURE — 99213 OFFICE O/P EST LOW 20 MIN: CPT | Performed by: UROLOGY

## 2018-04-26 RX ADMIN — TESTOSTERONE UNDECANOATE 750 MG: 250 INJECTION INTRAMUSCULAR at 09:16

## 2018-04-26 NOTE — NURSING NOTE
Pt presents to clinic for Aveed    Review of Systems:    Weight loss:    No     Recent fever/chills:  No   Night sweats:   No  Current skin rash:  No   Recent hair loss:  No  Heat intolerance:  No   Cold intolerance:  Yes  Chest pain:   No   Palpitations:   No  Shortness of breath:  No   Wheezing:   No  Constipation:    No   Diarrhea:   No   Nausea:   No   Vomiting:   No   Kidney/side pain:  No   Back pain:   No  Frequent headaches:  No   Dizziness:     No  Leg swelling:   Yes   Calf pain:    Yes

## 2018-04-26 NOTE — PROGRESS NOTES
Type of Visit  EST    Chief Complaint  Hypogonadism    HPI  Mr. Trent is a 37 y.o. male who follows up with hypogonadism.  Primary symptom of low energy started over 3 years ago.  He previously failed Axiron and short acting injections led to significant swings and testosterone level.  He has done quite well on Aveed however he was noticing significant drop in energy for the last 2 weeks of the 10 week cycle.  Because of this we changed his cycle 2 injections every 8 weeks.  Since the change his symptoms have been quite stable throughout the 8 week cycle.  He underwent labs 2 weeks ago.  He follows up today for Aveed injection and to review labs.  He denies side effects such as mood swings.    He is complaining of calf pain bilaterally.      Review of Systems  I personally reviewed the ROS with the patient.    Nursing Notes:   Grazyna Glasgow LPN  4/26/2018  9:15 AM  Signed  Pt presents to clinic for Aveed    Review of Systems:    Weight loss:    No     Recent fever/chills:  No   Night sweats:   No  Current skin rash:  No   Recent hair loss:  No  Heat intolerance:  No   Cold intolerance:  Yes  Chest pain:   No   Palpitations:   No  Shortness of breath:  No   Wheezing:   No  Constipation:    No   Diarrhea:   No   Nausea:   No   Vomiting:   No   Kidney/side pain:  No   Back pain:   No  Frequent headaches:  No   Dizziness:     No  Leg swelling:   Yes   Calf pain:    Yes            Physical Exam  Pulse 80  Resp 16  Wt 106.1 kg (234 lb)  BMI 32.64 kg/m2  Constitutional: No acute distress.  Alert and cooperative   Head: NCAT  Eyes: Conjunctivae normal  Cardiovascular: Regular rate.  Pulmonary/Chest: Respirations are even and non-labored bilaterally, no audible wheezing  Abdominal: Soft. No distension, tenderness, masses or guarding.   Neurological: A + O x 3.  Cranial Nerves II-XII grossly intact.  Extremities: BEST x 4, Warm. No clubbing.  No cyanosis.    Skin: Pink, warm and dry.  No visible rashes  noted.  Psychiatric:  Normal mood and affect  Back:  No left CVA tenderness.  No right CVA tenderness.  Genitourinary:  Nonpalpable bladder    Labs  Results for KENNETH TRENT (MRN [id number]) as of 4/26/2018 08:15   4/12/2018 08:50   Hematocrit 50.5   Testosterone Total 233       Results for KENNETH TRENT (MRN [id number]) as of 11/9/2017 12:39   8/10/2017 08:17 11/9/2017 11:40   TESTOSTERONE,TOTAL 250.3 181.1       Results for KENNETH TRENT (MRN [id number]) as of 12/19/2016 08:35   2/3/2016 17:06 12/14/2016 16:27   TSH 1.27    VITAMIN D TOTAL AFL 21.2    TESTOSTERONE FREE 74.00 (H) 4.49 (L)   TESTOSTERONE,TOTAL 2000 (H) 136 (L)       CREATININE (mg/dL)   Date Value   12/14/2016 1.09       Testosterone Injection  Today the patient received an injection of testosterone undecanoate 750mg.  This was given in the gluteus.  The results of this injection: None  Patient was monitored for 30 minutes following the injection.    Assessment  Mr. Trent is a 37 y.o. male with clinically symptomatic hypogonadism.  The new frequency has significantly improved his symptoms.  He is extremely satisfied with the new schedule.  Labs reviewed and within normal limits.    Plan  Aveed 750mg IM every 8 weeks (labs 6 weeks following the third injection planned)

## 2018-04-26 NOTE — MR AVS SNAPSHOT
After Visit Summary   4/26/2018    Julio Trent    MRN: [id number]           Patient Information     Date Of Birth          1980        Visit Information        Provider Department      4/26/2018 8:45 AM Raz Cabrera MD [facility]        Today's Diagnoses     Hypogonadism male    -  1       Follow-ups after your visit        Your next 10 appointments already scheduled     May 08, 2018  4:15 PM CDT   Office Visit with Ruben Valente MD   [facility] ([facility])    [address]  [place] [address] [phone]           Bring a current list of meds and any records pertaining to this visit. For Physicals, please bring immunization records and any forms needing to be filled out. Please arrive 10 minutes early to complete paperwork.            Jun 20, 2018  8:15 AM CDT   Return Visit with Raz Cabrera MD   [facility] ([facility])    [address]  [place] [address] [phone]              Who to contact     If you have questions or need follow up information about today's clinic visit or your schedule please contact [facility] directly at [phone].  Normal or non-critical lab and imaging results will be communicated to you by Lovethelookhart, letter or phone within 4 business days after the clinic has received the results. If you do not hear from us within 7 days, please contact the clinic through [employer] or phone. If you have a critical or abnormal lab result, we will notify you by phone as soon as possible.  Submit refill requests through S[employer]Star or call your pharmacy and they will forward the refill request to us. Please allow 3 business days for your refill to be completed.          Additional Information About Your Visit        LovethelookharWizIQ Information     SavingStar lets you send messages to your doctor, view your test results, renew your  "prescriptions, schedule appointments and more. To sign up, go to [email or web address] . Click on \"Log in\" on the left side of the screen, which will take you to the Welcome page. Then click on \"Sign up Now\" on the right side of the page.     You will be asked to enter the access code listed below, as well as some personal information. Please follow the directions to create your username and password.     Your access code is: [email or web address]  Expires: 2018  1:37 PM     Your access code will  in 90 days. If you need help or a new code, please call your [place] clinic or [phone].        Care EveryWhere ID     This is your Care EveryWhere ID. This could be used by other organizations to access your [place] medical records  [id number]        Your Vitals Were     Pulse Respirations BMI (Body Mass Index)             80 16 32.64 kg/m2          Blood Pressure from Last 3 Encounters:   18 118/60   18 120/70   12/15/17 120/84    Weight from Last 3 Encounters:   18 106.1 kg (234 lb)   18 102.1 kg (225 lb)   18 98.9 kg (218 lb)              Today, you had the following     No orders found for display       Primary Care Provider Office Phone # Fax #    Emanuel Mccrary [phone]       [address] [facility] [address]        Equal Access to Services     [facility] AH: Hadii lise aranda hadasho Soalokali, waaxda luqadaha, qaybta kaalmada luh, malik hubbard . So [facility] [phone].    ATENCIÓN: Si habla vladislavañol, tiene a ambrose disposición servicios gratuitos de asistencia lingüística. Llame al [phone].    We comply with applicable federal civil rights laws and [place] laws. We do not discriminate on the basis of race, color, national origin, age, disability, sex, sexual orientation, or gender identity.            Thank you!     Thank you for choosing [facility] AND \[facility]\""  for your care. Our goal is always to " provide you with excellent care. Hearing back from our patients is one way we can continue to improve our services. Please take a few minutes to complete the written survey that you may receive in the mail after your visit with us. Thank you!             Your Updated Medication List - Protect others around you: Learn how to safely use, store and throw away your medicines at www.disposemymeds.org.          This list is accurate as of 4/26/18 10:24 AM.  Always use your most recent med list.                   Brand Name Dispense Instructions for use Diagnosis    ciprofloxacin-dexamethasone otic suspension    CIPRODEX     Place 4 drops Into the left ear 2 times daily

## 2018-05-08 ENCOUNTER — OFFICE VISIT (OUTPATIENT)
Dept: FAMILY MEDICINE | Facility: OTHER | Age: 38
End: 2018-05-08
Attending: FAMILY MEDICINE
Payer: COMMERCIAL

## 2018-05-08 VITALS
DIASTOLIC BLOOD PRESSURE: 70 MMHG | WEIGHT: 235.4 LBS | HEART RATE: 72 BPM | SYSTOLIC BLOOD PRESSURE: 130 MMHG | HEIGHT: 71 IN | BODY MASS INDEX: 32.96 KG/M2 | RESPIRATION RATE: 16 BRPM

## 2018-05-08 DIAGNOSIS — F41.9 MODERATE ANXIETY: ICD-10-CM

## 2018-05-08 DIAGNOSIS — E78.1 HYPERTRIGLYCERIDEMIA: ICD-10-CM

## 2018-05-08 DIAGNOSIS — R73.9 ELEVATED RANDOM BLOOD GLUCOSE LEVEL: Primary | ICD-10-CM

## 2018-05-08 DIAGNOSIS — M79.10 MYALGIA: ICD-10-CM

## 2018-05-08 LAB
ALBUMIN SERPL-MCNC: 4.6 G/DL (ref 3.5–5.7)
ALP SERPL-CCNC: 47 U/L (ref 34–104)
ALT SERPL W P-5'-P-CCNC: 56 U/L (ref 7–52)
ANION GAP SERPL CALCULATED.3IONS-SCNC: 15 MMOL/L (ref 3–14)
AST SERPL W P-5'-P-CCNC: 31 U/L (ref 13–39)
BILIRUB SERPL-MCNC: 0.5 MG/DL (ref 0.3–1)
BUN SERPL-MCNC: 14 MG/DL (ref 7–25)
CALCIUM SERPL-MCNC: 10.1 MG/DL (ref 8.6–10.3)
CHLORIDE SERPL-SCNC: 102 MMOL/L (ref 98–107)
CHOLEST SERPL-MCNC: 229 MG/DL
CK SERPL-CCNC: 201 U/L (ref 30–223)
CO2 SERPL-SCNC: 23 MMOL/L (ref 21–31)
CREAT SERPL-MCNC: 1.18 MG/DL (ref 0.7–1.3)
GFR SERPL CREATININE-BSD FRML MDRD: 70 ML/MIN/1.7M2
GLUCOSE SERPL-MCNC: 149 MG/DL (ref 70–105)
HBA1C MFR BLD: 5.7 % (ref 4–6)
HDLC SERPL-MCNC: 39 MG/DL (ref 23–92)
LDLC SERPL CALC-MCNC: ABNORMAL MG/DL
NONHDLC SERPL-MCNC: 190 MG/DL
POTASSIUM SERPL-SCNC: 4.2 MMOL/L (ref 3.5–5.1)
PROT SERPL-MCNC: 7.4 G/DL (ref 6.4–8.9)
SODIUM SERPL-SCNC: 140 MMOL/L (ref 134–144)
TRIGL SERPL-MCNC: 581 MG/DL
TSH SERPL DL<=0.05 MIU/L-ACNC: 3.76 IU/ML (ref 0.34–5.6)

## 2018-05-08 PROCEDURE — 36415 COLL VENOUS BLD VENIPUNCTURE: CPT | Performed by: FAMILY MEDICINE

## 2018-05-08 PROCEDURE — 83036 HEMOGLOBIN GLYCOSYLATED A1C: CPT | Performed by: FAMILY MEDICINE

## 2018-05-08 PROCEDURE — 80061 LIPID PANEL: CPT | Performed by: FAMILY MEDICINE

## 2018-05-08 PROCEDURE — 99214 OFFICE O/P EST MOD 30 MIN: CPT | Performed by: FAMILY MEDICINE

## 2018-05-08 PROCEDURE — 80053 COMPREHEN METABOLIC PANEL: CPT | Performed by: FAMILY MEDICINE

## 2018-05-08 PROCEDURE — 82550 ASSAY OF CK (CPK): CPT | Performed by: FAMILY MEDICINE

## 2018-05-08 PROCEDURE — 84443 ASSAY THYROID STIM HORMONE: CPT | Performed by: FAMILY MEDICINE

## 2018-05-08 ASSESSMENT — PAIN SCALES - GENERAL: PAINLEVEL: MILD PAIN (2)

## 2018-05-08 ASSESSMENT — PATIENT HEALTH QUESTIONNAIRE - PHQ9: 5. POOR APPETITE OR OVEREATING: SEVERAL DAYS

## 2018-05-08 ASSESSMENT — ANXIETY QUESTIONNAIRES
7. FEELING AFRAID AS IF SOMETHING AWFUL MIGHT HAPPEN: NOT AT ALL
GAD7 TOTAL SCORE: 5
2. NOT BEING ABLE TO STOP OR CONTROL WORRYING: SEVERAL DAYS
IF YOU CHECKED OFF ANY PROBLEMS ON THIS QUESTIONNAIRE, HOW DIFFICULT HAVE THESE PROBLEMS MADE IT FOR YOU TO DO YOUR WORK, TAKE CARE OF THINGS AT HOME, OR GET ALONG WITH OTHER PEOPLE: SOMEWHAT DIFFICULT
5. BEING SO RESTLESS THAT IT IS HARD TO SIT STILL: NOT AT ALL
3. WORRYING TOO MUCH ABOUT DIFFERENT THINGS: SEVERAL DAYS
1. FEELING NERVOUS, ANXIOUS, OR ON EDGE: SEVERAL DAYS
6. BECOMING EASILY ANNOYED OR IRRITABLE: SEVERAL DAYS

## 2018-05-08 ASSESSMENT — ENCOUNTER SYMPTOMS
HYPERACTIVE: 0
SLEEP DISTURBANCE: 1
DYSPHORIC MOOD: 0
FATIGUE: 1
NERVOUS/ANXIOUS: 1
BRUISES/BLEEDS EASILY: 0

## 2018-05-08 NOTE — MR AVS SNAPSHOT
"              After Visit Summary   5/8/2018    Julio Trent    MRN: [id number]           Patient Information     Date Of Birth          1980        Visit Information        Provider Department      5/8/2018 4:15 PM Ruben Valente MD [facility]        Today's Diagnoses     Elevated random blood glucose level    -  1    Moderate anxiety        Myalgia           Follow-ups after your visit        Your next 10 appointments already scheduled     Jun 20, 2018  8:15 AM CDT   Return Visit with Raz Cabrera MD   [facility]    [address]  [place] [address] [phone]              Who to contact     If you have questions or need follow up information about today's clinic visit or your schedule please contact [facility] directly at [phone].  Normal or non-critical lab and imaging results will be communicated to you by EBOOKAPLACEhart, letter or phone within 4 business days after the clinic has received the results. If you do not hear from us within 7 days, please contact the clinic through EBOOKAPLACEhart or phone. If you have a critical or abnormal lab result, we will notify you by phone as soon as possible.  Submit refill requests through [employer]ter or call your pharmacy and they will forward the refill request to us. Please allow 3 business days for your refill to be completed.          Additional Information About Your Visit        MyChart Information     Flipter lets you send messages to your doctor, view your test results, renew your prescriptions, schedule appointments and more. To sign up, go to [email or web address] . Click on \"Log in\" on the left side of the screen, which will take you to the Welcome page. Then click on \"Sign up Now\" on the right side of the page.     You will be asked to enter the access code listed below, as well as some personal information. Please follow the directions to create " "your username and password.     Your access code is: [email or web address]  Expires: 2018  1:37 PM     Your access code will  in 90 days. If you need help or a new code, please call your [facility] or [phone].        Care EveryWhere ID     This is your Care EveryWhere ID. This could be used by other organizations to access your [place] medical records  [id number]        Your Vitals Were     Pulse Respirations Height BMI (Body Mass Index)          72 16 5' 11\" (1.803 m) 32.83 kg/m2         Blood Pressure from Last 3 Encounters:   18 130/70   18 118/60   18 120/70    Weight from Last 3 Encounters:   18 235 lb 6.4 oz (106.8 kg)   18 234 lb (106.1 kg)   18 225 lb (102.1 kg)              We Performed the Following     CK total     Comprehensive metabolic panel     Hemoglobin A1c     Lipid Panel     TSH        Primary Care Provider Office Phone # Fax #    Ruben Valente [phone]       [address] [facility] [address]        Equal Access to Services     [facility] AH: Hadii lise downso Sonorma, waaxda luqadaha, qaybta kaalmada adeegyada, malik wiley. So [facility] [phone].    ATENCIÓN: Si habla español, tiene a ambrose disposición servicios gratuitos de asistencia lingüística. Llame al [phone].    We comply with applicable federal civil rights laws and [place] laws. We do not discriminate on the basis of race, color, national origin, age, disability, sex, sexual orientation, or gender identity.            Thank you!     Thank you for choosing [facility]  for your care. Our goal is always to provide you with excellent care. Hearing back from our patients is one way we can continue to improve our services. Please take a few minutes to complete the written survey that you may receive in the mail after your visit with us. Thank you!             Your Updated Medication List - Protect others " around you: Learn how to safely use, store and throw away your medicines at www.disposemymeds.org.          This list is accurate as of 5/8/18  5:09 PM.  Always use your most recent med list.                   Brand Name Dispense Instructions for use Diagnosis    ciprofloxacin-dexamethasone otic suspension    CIPRODEX     Place 4 drops Into the left ear 2 times daily

## 2018-05-08 NOTE — NURSING NOTE
Coming in to have a check up, feeling like crap, pain all the time  Crystal Ware LPN on 5/8/2018 at 4:26 PM

## 2018-05-08 NOTE — LETTER
May 10, 2018      Julio Trent  [address] [address] [facility] [place] [address]        Dear Julio,     The labs show the glucose is still high, but you do not have diabetes (the A1c is normal).  The triglycerides are also very high- this can be from high glucose and/or from alcohol.  Anything over 500 puts you at risk for pancreatitis.  Please follow up with me in the next few weeks to go over options for treatment.  It is possible this is causing the symptoms in your legs and feet.    Results for orders placed or performed in visit on 05/08/18   TSH   Result Value Ref Range    Thyrotropin 3.76 0.34 - 5.60 IU/mL   Comprehensive metabolic panel   Result Value Ref Range    Sodium 140 134 - 144 mmol/L    Potassium 4.2 3.5 - 5.1 mmol/L    Chloride 102 98 - 107 mmol/L    Carbon Dioxide 23 21 - 31 mmol/L    Anion Gap 15 (H) 3 - 14 mmol/L    Glucose 149 (H) 70 - 105 mg/dL    Urea Nitrogen 14 7 - 25 mg/dL    Creatinine 1.18 0.70 - 1.30 mg/dL    GFR Estimate 70 >60 mL/min/1.7m2    GFR Estimate If Black 85 >60 mL/min/1.7m2    Calcium 10.1 8.6 - 10.3 mg/dL    Bilirubin Total 0.5 0.3 - 1.0 mg/dL    Albumin 4.6 3.5 - 5.7 g/dL    Protein Total 7.4 6.4 - 8.9 g/dL    Alkaline Phosphatase 47 34 - 104 U/L    ALT 56 (H) 7 - 52 U/L    AST 31 13 - 39 U/L   Hemoglobin A1c   Result Value Ref Range    Hemoglobin A1C 5.7 4.0 - 6.0 %   Lipid Panel   Result Value Ref Range    Cholesterol 229 (H) <200 mg/dL    Triglycerides 581 (H) <150 mg/dL    HDL Cholesterol 39 23 - 92 mg/dL    LDL Cholesterol Calculated  <100 mg/dL     Cannot estimate LDL when triglyceride exceeds 400 mg/dL    Non HDL Cholesterol 190 (H) <130 mg/dL   CK total   Result Value Ref Range    CK Total 201 30 - 223 U/L           Sincerely,        Ruben Valente MD
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
## 2018-05-09 ASSESSMENT — ANXIETY QUESTIONNAIRES: GAD7 TOTAL SCORE: 5

## 2018-05-09 ASSESSMENT — PATIENT HEALTH QUESTIONNAIRE - PHQ9: SUM OF ALL RESPONSES TO PHQ QUESTIONS 1-9: 8

## 2018-05-10 ASSESSMENT — ENCOUNTER SYMPTOMS
MYALGIAS: 1
ARTHRALGIAS: 0
WEAKNESS: 0

## 2018-05-29 ENCOUNTER — TELEPHONE (OUTPATIENT)
Dept: FAMILY MEDICINE | Facility: OTHER | Age: 38
End: 2018-05-29

## 2018-05-29 ENCOUNTER — TRANSFERRED RECORDS (OUTPATIENT)
Dept: HEALTH INFORMATION MANAGEMENT | Facility: OTHER | Age: 38
End: 2018-05-29

## 2018-05-29 ENCOUNTER — HOSPITAL ENCOUNTER (OUTPATIENT)
Dept: GENERAL RADIOLOGY | Facility: OTHER | Age: 38
Discharge: HOME | End: 2018-05-29
Attending: NURSE PRACTITIONER | Admitting: NURSE PRACTITIONER
Payer: COMMERCIAL

## 2018-05-29 ENCOUNTER — HOSPITAL ENCOUNTER (OUTPATIENT)
Dept: GENERAL RADIOLOGY | Facility: OTHER | Age: 38
End: 2018-05-29
Attending: NURSE PRACTITIONER
Payer: COMMERCIAL

## 2018-05-29 ENCOUNTER — OFFICE VISIT (OUTPATIENT)
Dept: FAMILY MEDICINE | Facility: OTHER | Age: 38
End: 2018-05-29
Attending: NURSE PRACTITIONER
Payer: COMMERCIAL

## 2018-05-29 VITALS
WEIGHT: 225 LBS | HEART RATE: 72 BPM | SYSTOLIC BLOOD PRESSURE: 108 MMHG | DIASTOLIC BLOOD PRESSURE: 60 MMHG | BODY MASS INDEX: 31.38 KG/M2

## 2018-05-29 DIAGNOSIS — M54.2 CERVICALGIA: Primary | ICD-10-CM

## 2018-05-29 DIAGNOSIS — Z91.81 HISTORY OF FALL: ICD-10-CM

## 2018-05-29 DIAGNOSIS — M54.2 CERVICALGIA: ICD-10-CM

## 2018-05-29 PROCEDURE — 72072 X-RAY EXAM THORAC SPINE 3VWS: CPT

## 2018-05-29 PROCEDURE — 99214 OFFICE O/P EST MOD 30 MIN: CPT | Performed by: NURSE PRACTITIONER

## 2018-05-29 PROCEDURE — 72050 X-RAY EXAM NECK SPINE 4/5VWS: CPT

## 2018-05-29 ASSESSMENT — PAIN SCALES - GENERAL: PAINLEVEL: SEVERE PAIN (6)

## 2018-05-29 ASSESSMENT — ENCOUNTER SYMPTOMS: NECK PAIN: 1

## 2018-05-29 NOTE — NURSING NOTE
Patient present for fall off of the back of his tailgate landed on left side of [facility] and now has neck pain and a bit of a sore back.   Megan Mera LPN........................5/29/2018  10:27 AM

## 2018-05-29 NOTE — PATIENT INSTRUCTIONS
Ice frequently use ibuprofen    Use ibuprofen with food 3 times a day for inflammation    Rest--avoid any sudden movements or jarring    We will work on physical therapy appointment soon as possible    If it any time your feeling worse please return to clinic or ER

## 2018-05-29 NOTE — PROGRESS NOTES
SUBJECTIVE:   Julio Trent is a 37 year old male who presents to clinic today for the following health issues:    Presents for history of a fall yesterday while he was loading wood in the back of his pickup truck with a tailgate open accidentally fell off the back landing on his left hip and trying to break his fall sustained a neck whiplash injury  He reports he may have hit the back of his head after he fell in response to the whiplash--reports he has sustained other concussions in the past denies any headache, nausea, memory issues  Denies any weakness numbness or tingling, no changes in vision.  Feels his entire neck tender both sides generally painful particularly painful trapezius bilaterally  He has not tried any ibuprofen or ice at home yet, was uncomfortable sleeping last night  Has been to a chiropractor in the past however he does not want an adjustment           GCS:   Motor 6=Obeys commands   Verbal 5=Oriented   Eye Opening 4=Spontaneous   Total: 15              HPI    Patient Active Problem List   Diagnosis     Bilateral carpal tunnel syndrome     Decreased sex drive     Elevated random blood glucose level     Hypogonadism male     Malaise and fatigue     Mixed hyperlipidemia     Moderate anxiety     Moderate major depression (H)     Myalgia     Prediabetes     Sleep difficulties     Vitamin D deficiency     Past Surgical History:   Procedure Laterality Date     ARTHROSCOPY KNEE      No Comments Provided     OTHER SURGICAL HISTORY      349336,OTHER     OTHER SURGICAL HISTORY      2005,600000,OTHER,Bilateral,due to gynecomastia       Social History   Substance Use Topics     Smoking status: Former Smoker     Smokeless tobacco: Former User     Alcohol use 0.0 oz/week      Comment: Alcoholic Drinks/day: socially     Family History   Problem Relation Age of Onset     DIABETES Father      Diabetes     Hypertension Father      Hypertension     Other - See Comments Mother      No Known Problems     Skin  Cancer Sister      Skin cancer     Anxiety Disorder Sister      Anxiety disorder         Current Outpatient Prescriptions   Medication Sig Dispense Refill     tiZANidine (ZANAFLEX) 4 MG tablet Take 1 tablet (4 mg) by mouth 3 times daily as needed for muscle spasms 30 tablet 0     Allergies   Allergen Reactions     Iron Rash     BP Readings from Last 3 Encounters:   05/29/18 108/60   05/08/18 130/70   03/01/18 118/60    Wt Readings from Last 3 Encounters:   05/29/18 225 lb (102.1 kg)   05/08/18 235 lb 6.4 oz (106.8 kg)   04/26/18 234 lb (106.1 kg)                  Labs reviewed in EPIC    Review of Systems   Musculoskeletal: Positive for neck pain.   All other systems reviewed and are negative.       OBJECTIVE:     /60 (BP Location: Right arm, Patient Position: Sitting, Cuff Size: Adult Large)  Pulse 72  Wt 225 lb (102.1 kg)  BMI 31.38 kg/m2  Body mass index is 31.38 kg/(m^2).  Physical Exam   Constitutional: He is oriented to person, place, and time. He appears well-developed and well-nourished.   HENT:   Head: Normocephalic and atraumatic.   Eyes: Conjunctivae and EOM are normal. Pupils are equal, round, and reactive to light.   Neck: Normal range of motion. Neck supple.   Range of motion intact however painful    Reproducible palpable tenderness bilateral trapezius       Cardiovascular: Normal rate.    Pulmonary/Chest: Effort normal.   Musculoskeletal: Normal range of motion.   Lymphadenopathy:     He has no cervical adenopathy.   Neurological: He is alert and oriented to person, place, and time. He has normal reflexes. No cranial nerve deficit. He exhibits normal muscle tone. Coordination normal.   Skin: Skin is warm and dry.   Psychiatric: He has a normal mood and affect. His behavior is normal. Judgment and thought content normal.   Nursing note and vitals reviewed.      Results for orders placed or performed in visit on 05/29/18   XR Cervical Spine G/E 4 Views    Narrative    Procedure: XR CERVICAL  SPINE G/E 4 VW    HISTORY:  fall injury yesterday out of truck--hit lower back and neck  whiplash injury; Cervicalgia; History of fall.     TECHNIQUE: Cervical spine 5 views.    COMPARISON: None.    FINDINGS:    There is normal alignment.  Vertebral body heights are maintained.   Disc spaces are maintained.       Impression    IMPRESSION: Unremarkable cervical spine x-rays.      OCTAVIA PEREZ MD     PROCEDURE: XR THORACIC SPINE 3 VW 5/29/2018 11:05 AM     HISTORY: ; Cervicalgia; History of fall     COMPARISONS: None.     TECHNIQUE: Thoracic spine 3 views     FINDINGS: There is normal alignment. Vertebral body heights and disc  spaces are maintained. No acute fracture or subluxation.          IMPRESSION: Unremarkable thoracic spine x-rays.     OCTAVIA PEREZ MD         ASSESSMENT/PLAN:    24 hour injury history  No red flag neurological symptoms or evidence of concussion  Reviewed x-rays with radiologist--no evidence of dislocation subluxation or fracture    Clinical history and exam consistent with severe muscle spasm in response to fall    Discussed results with patient no evidence of concussion patient agrees--no additional imaging warranted at this time    Recommend conservative therapies with PT, ice, rest and avoidance of any impact    Offered soft neck brace for comfort--declines     Recommend ibuprofen TID    Given limited zanaflex to try at home for spasms    Discussed red flag symptom onset RTC or ED      Attempted to assist with PT apt today with [facility] and another office    [facility] was able to obtain same day apt for pain therapy            1. Cervicalgia  - XR Cervical Spine G/E 4 Views  - XR Thoracic Spine 3 Views; Future  - tiZANidine (ZANAFLEX) 4 MG tablet; Take 1 tablet (4 mg) by mouth 3 times daily as needed for muscle spasms  Dispense: 30 tablet; Refill: 0    2. History of fall    - XR Cervical Spine G/E 4 Views  - XR Thoracic Spine 3 Views; Future  - tiZANidine (ZANAFLEX) 4 MG tablet; Take 1  tablet (4 mg) by mouth 3 times daily as needed for muscle spasms  Dispense: 30 tablet; Refill: 0      DEION Avina CNP  [facility] AND [facility]

## 2018-05-29 NOTE — MR AVS SNAPSHOT
After Visit Summary   5/29/2018    Julio Trent    MRN: [id number]           Patient Information     Date Of Birth          1980        Visit Information        Provider Department      5/29/2018 10:30 AM Jolie Angel APRN CNP [facility]        Today's Diagnoses     Cervicalgia    -  1    History of fall          Care Instructions    Ice frequently use ibuprofen    Use ibuprofen with food 3 times a day for inflammation    Rest--avoid any sudden movements or jarring    We will work on physical therapy appointment soon as possible    If it any time your feeling worse please return to clinic or ER          Follow-ups after your visit        Follow-up notes from your care team     Return if symptoms worsen or fail to improve.      Your next 10 appointments already scheduled     Jun 20, 2018  8:15 AM CDT   Return Visit with Raz Cabrera MD   [facility] ([facility])    [address]  [facility] [address] [phone]              Future tests that were ordered for you today     Open Future Orders        Priority Expected Expires Ordered    XR Thoracic Spine 3 Views Routine 5/29/2018 5/29/2019 5/29/2018            Who to contact     If you have questions or need follow up information about today's clinic visit or your schedule please contact [facility] directly at [phone].  Normal or non-critical lab and imaging results will be communicated to you by MyChart, letter or phone within 4 business days after the clinic has received the results. If you do not hear from us within 7 days, please contact the clinic through MyChart or phone. If you have a critical or abnormal lab result, we will notify you by phone as soon as possible.  Submit refill requests through [employer] or call your pharmacy and they will forward the refill request to us. Please allow 3 business days for your refill to be  "completed.          Additional Information About Your Visit        [employer] Information     [employer] lets you send messages to your doctor, view your test results, renew your prescriptions, schedule appointments and more. To sign up, go to [email or web address] . Click on \"Log in\" on the left side of the screen, which will take you to the Welcome page. Then click on \"Sign up Now\" on the right side of the page.     You will be asked to enter the access code listed below, as well as some personal information. Please follow the directions to create your username and password.     Your access code is: [email or web address]  Expires: 2018  1:37 PM     Your access code will  in 90 days. If you need help or a new code, please call your [place] clinic or [phone].        Care EveryWhere ID     This is your Care EveryWhere ID. This could be used by other organizations to access your [place] medical records  [id number]        Your Vitals Were     Pulse BMI (Body Mass Index)                72 31.38 kg/m2           Blood Pressure from Last 3 Encounters:   18 108/60   18 130/70   18 118/60    Weight from Last 3 Encounters:   18 225 lb (102.1 kg)   18 235 lb 6.4 oz (106.8 kg)   18 234 lb (106.1 kg)              We Performed the Following     XR Cervical Spine G/E 4 Views        Primary Care Provider Office Phone # Fax #    Ruben Valente [phone] [address] [facility] [address]        Equal Access to Services     [facility]: Hadii lise Giang, waaxda luqadaha, qaybta abdulazizalmalik sun. So [place] [phone].    ATENCIÓN: Si habla español, tiene a ambrose disposición servicios gratuitos de asistencia lingüística. Llame al [phone].    We comply with applicable federal civil rights laws and [place] laws. We do not discriminate on the basis of race, color, national origin, age, disability, " sex, sexual orientation, or gender identity.            Thank you!     Thank you for choosing [facility] AND [facility]  for your care. Our goal is always to provide you with excellent care. Hearing back from our patients is one way we can continue to improve our services. Please take a few minutes to complete the written survey that you may receive in the mail after your visit with us. Thank you!             Your Updated Medication List - Protect others around you: Learn how to safely use, store and throw away your medicines at www.disposemymeds.org.      Notice  As of 5/29/2018 11:32 AM    You have not been prescribed any medications.

## 2018-06-28 ENCOUNTER — OFFICE VISIT (OUTPATIENT)
Dept: UROLOGY | Facility: OTHER | Age: 38
End: 2018-06-28
Attending: UROLOGY
Payer: COMMERCIAL

## 2018-06-28 VITALS — HEART RATE: 80 BPM | BODY MASS INDEX: 31.99 KG/M2 | RESPIRATION RATE: 14 BRPM | WEIGHT: 229.4 LBS

## 2018-06-28 DIAGNOSIS — E29.1 HYPOGONADISM MALE: Primary | ICD-10-CM

## 2018-06-28 PROCEDURE — 99212 OFFICE O/P EST SF 10 MIN: CPT | Performed by: UROLOGY

## 2018-06-28 PROCEDURE — 96372 THER/PROPH/DIAG INJ SC/IM: CPT

## 2018-06-28 PROCEDURE — 25000128 H RX IP 250 OP 636: Performed by: UROLOGY

## 2018-06-28 PROCEDURE — 96401 CHEMO ANTI-NEOPL SQ/IM: CPT

## 2018-06-28 RX ADMIN — TESTOSTERONE UNDECANOATE 750 MG: 250 INJECTION INTRAMUSCULAR at 11:56

## 2018-06-28 ASSESSMENT — PAIN SCALES - GENERAL: PAINLEVEL: MODERATE PAIN (5)

## 2018-06-28 NOTE — NURSING NOTE
Pt presents to clinic for Aveed injection  Review of Systems:    Weight loss:    No     Recent fever/chills:  No   Night sweats:   Yes  Current skin rash:  No   Recent hair loss:  No  Heat intolerance:  No   Cold intolerance:  Yes  Chest pain:   No   Palpitations:   No  Shortness of breath:  No   Wheezing:   No  Constipation:    No   Diarrhea:   No   Nausea:   No   Vomiting:   No   Kidney/side pain:  No   Back pain:   No  Frequent headaches:  No   Dizziness:     No  Leg swelling:   Yes   Calf pain:    No

## 2018-06-28 NOTE — MR AVS SNAPSHOT
"              After Visit Summary   6/28/2018    Julio Trent    MRN: [id number]           Patient Information     Date Of Birth          1980        Visit Information        Provider Department      6/28/2018 11:45 AM Raz Cabrera MD [facility]        Today's Diagnoses     Hypogonadism male    -  1       Follow-ups after your visit        Your next 10 appointments already scheduled     Aug 23, 2018 10:00 AM CDT   Return Visit with Raz Cabrera MD   [facility] ([facility])    [address]  [place] [address] [phone]              Who to contact     If you have questions or need follow up information about today's clinic visit or your schedule please contact [facility] directly at [phone].  Normal or non-critical lab and imaging results will be communicated to you by P[employer], letter or phone within 4 business days after the clinic has received the results. If you do not hear from us within 7 days, please contact the clinic through [employer] or phone. If you have a critical or abnormal lab result, we will notify you by phone as soon as possible.  Submit refill requests through [employer] or call your pharmacy and they will forward the refill request to us. Please allow 3 business days for your refill to be completed.          Additional Information About Your Visit        MyChart Information     [employer] lets you send messages to your doctor, view your test results, renew your prescriptions, schedule appointments and more. To sign up, go to [email or web address] . Click on \"Log in\" on the left side of the screen, which will take you to the Welcome page. Then click on \"Sign up Now\" on the right side of the page.     You will be asked to enter the access code listed below, as well as some personal information. Please follow the directions to create your username and password.     Your access code is: " [email or web address]  Expires: 2018  9:11 AM     Your access code will  in 90 days. If you need help or a new code, please call your [place] clinic or [phone].        Care EveryWhere ID     This is your Care EveryWhere ID. This could be used by other organizations to access your [place] medical records  [id number]        Your Vitals Were     Pulse Respirations BMI (Body Mass Index)             80 14 31.99 kg/m2          Blood Pressure from Last 3 Encounters:   18 108/60   18 130/70   18 118/60    Weight from Last 3 Encounters:   18 104.1 kg (229 lb 6.4 oz)   18 102.1 kg (225 lb)   18 106.8 kg (235 lb 6.4 oz)              Today, you had the following     No orders found for display       Primary Care Provider Office Phone # Fax #    Ruben Valente [phone]       [address] [facility] [address]        Equal Access to Services     [facility]: Hadii aad ku hadasho Sonorma, waaxda luqadaha, qaybta kaalmada adekait, malik hubbard . So [facility] [phone].    ATENCIÓN: Si habla español, tiene a ambrose disposición servicios gratuitos de asistencia lingüística. [facility] [phone].    We comply with applicable federal civil rights laws and [place] laws. We do not discriminate on the basis of race, color, national origin, age, disability, sex, sexual orientation, or gender identity.            Thank you!     Thank you for choosing [facility] AND [facility]  for your care. Our goal is always to provide you with excellent care. Hearing back from our patients is one way we can continue to improve our services. Please take a few minutes to complete the written survey that you may receive in the mail after your visit with us. Thank you!             Your Updated Medication List - Protect others around you: Learn how to safely use, store and throw away your medicines at www.disposemymeds.org.      Notice  As of  6/28/2018  4:02 PM    You have not been prescribed any medications.

## 2018-06-28 NOTE — PROGRESS NOTES
Type of Visit  EST    Chief Complaint  Hypogonadism    HPI  Mr. Trent is a 37 y.o. male who follows up with hypogonadism.  Primary symptom of low energy started over 3 years ago.  He previously failed Axiron and short acting injections led to significant swings and testosterone level.  He is currently undergoing replacement with Aveed every 8 weeks.  This schedule has provided benefit for improved low energy.  He denies side effects.      Review of Systems  I personally reviewed the ROS with the patient.    Nursing Notes:   Grazyna Glasgow LPN  6/28/2018 11:37 AM  Addendum  Pt presents to clinic for Aveed injection  Review of Systems:    Weight loss:    No     Recent fever/chills:  No   Night sweats:   Yes  Current skin rash:  No   Recent hair loss:  No  Heat intolerance:  No   Cold intolerance:  Yes  Chest pain:   No   Palpitations:   No  Shortness of breath:  No   Wheezing:   No  Constipation:    No   Diarrhea:   No   Nausea:   No   Vomiting:   No   Kidney/side pain:  No   Back pain:   No  Frequent headaches:  No   Dizziness:     No  Leg swelling:   Yes   Calf pain:    No            Physical Exam  Pulse 80  Resp 14  Wt 104.1 kg (229 lb 6.4 oz)  BMI 31.99 kg/m2  Constitutional: No acute distress.  Alert and cooperative   Head: NCAT  Eyes: Conjunctivae normal  Cardiovascular: Regular rate.  Pulmonary/Chest: Respirations are even and non-labored bilaterally, no audible wheezing  Abdominal: Soft. No distension, tenderness, masses or guarding.   Neurological: A + O x 3.  Cranial Nerves II-XII grossly intact.  Extremities: BEST x 4, Warm. No clubbing.  No cyanosis.    Skin: Pink, warm and dry.  No visible rashes noted.  Psychiatric:  Normal mood and affect  Back:  No left CVA tenderness.  No right CVA tenderness.  Genitourinary:  Nonpalpable bladder    Labs  Results for KENNETH TRENT (MRN [id number]) as of 4/26/2018 08:15   4/12/2018 08:50   Hematocrit 50.5   Testosterone Total 233       Results for RAISSA  KENNETH MIRANDA (MRN [id number]) as of 11/9/2017 12:39   8/10/2017 08:17 11/9/2017 11:40   TESTOSTERONE,TOTAL 250.3 181.1       Results for KENNETH TRENT (MRN [id number]) as of 12/19/2016 08:35   2/3/2016 17:06 12/14/2016 16:27   TSH 1.27    VITAMIN D TOTAL AFL 21.2    TESTOSTERONE FREE 74.00 (H) 4.49 (L)   TESTOSTERONE,TOTAL 2000 (H) 136 (L)       CREATININE (mg/dL)   Date Value   12/14/2016 1.09       Testosterone Injection  Today the patient received an injection of testosterone undecanoate 750mg.  This was given in the gluteus.  The results of this injection: None  Patient was monitored for 30 minutes following the injection.    Assessment  Mr. Trent is a 37 y.o. male with clinically symptomatic hypogonadism.  The new frequency has significantly improved his symptoms.  He is extremely satisfied with the new schedule.    Plan  Aveed 750mg IM every 8 weeks (labs 6 weeks following the third injection planned)

## 2018-07-16 ENCOUNTER — TRANSFERRED RECORDS (OUTPATIENT)
Dept: HEALTH INFORMATION MANAGEMENT | Facility: OTHER | Age: 38
End: 2018-07-16

## 2018-08-14 ENCOUNTER — TRANSFERRED RECORDS (OUTPATIENT)
Dept: HEALTH INFORMATION MANAGEMENT | Facility: OTHER | Age: 38
End: 2018-08-14

## 2018-08-22 ENCOUNTER — TELEPHONE (OUTPATIENT)
Dept: UROLOGY | Facility: OTHER | Age: 38
End: 2018-08-22

## 2018-08-22 NOTE — TELEPHONE ENCOUNTER
Julio called and said that he can't make his appointment tomorrow due to work.  The only way he could be seen tomorrow is if he would be out of here by 9 am.  He is wondering if that would be possible otherwise he is gone all next week, so would need to be the week after.  Teresa Rachel on 8/22/2018 at 2:38 PM

## 2018-08-23 NOTE — TELEPHONE ENCOUNTER
I spoke with Julio and he is going to try and make the 10 am appointment.  Teresa Rachel on 8/23/2018 at 8:08 AM

## 2018-08-31 ENCOUNTER — OFFICE VISIT (OUTPATIENT)
Dept: UROLOGY | Facility: OTHER | Age: 38
End: 2018-08-31
Attending: UROLOGY
Payer: COMMERCIAL

## 2018-08-31 VITALS
BODY MASS INDEX: 32.27 KG/M2 | WEIGHT: 231.4 LBS | DIASTOLIC BLOOD PRESSURE: 72 MMHG | SYSTOLIC BLOOD PRESSURE: 110 MMHG | RESPIRATION RATE: 18 BRPM

## 2018-08-31 DIAGNOSIS — E29.1 HYPOGONADISM MALE: Primary | ICD-10-CM

## 2018-08-31 PROCEDURE — 99213 OFFICE O/P EST LOW 20 MIN: CPT | Performed by: UROLOGY

## 2018-08-31 PROCEDURE — 96372 THER/PROPH/DIAG INJ SC/IM: CPT

## 2018-08-31 PROCEDURE — 25000128 H RX IP 250 OP 636: Performed by: UROLOGY

## 2018-08-31 RX ADMIN — TESTOSTERONE UNDECANOATE 750 MG: 250 INJECTION INTRAMUSCULAR at 08:39

## 2018-08-31 ASSESSMENT — PAIN SCALES - GENERAL: PAINLEVEL: MODERATE PAIN (4)

## 2018-08-31 NOTE — NURSING NOTE
Pt presents to clinic for 10 week Aveed injection    Review of Systems:    Weight loss:    No     Recent fever/chills:  No   Night sweats:   No  Current skin rash:  No   Recent hair loss:  No  Heat intolerance:  No   Cold intolerance:  Yes  Chest pain:   Yes   Palpitations:   No  Shortness of breath:  Yes   Wheezing:   No  Constipation:    No   Diarrhea:   No   Nausea:   No   Vomiting:   No   Kidney/side pain:  Yes   Back pain:   Yes  Frequent headaches:  Yes   Dizziness:     No  Leg swelling:   Yes   Calf pain:    Yes    Pt states that he has cramps on both sides

## 2018-08-31 NOTE — MR AVS SNAPSHOT
After Visit Summary   8/31/2018    Julio Trent    MRN: [id number]           Patient Information     Date Of Birth          1980        Visit Information        Provider Department      8/31/2018 8:30 AM Raz Cabrera MD [facility]        Today's Diagnoses     Hypogonadism male    -  1       Follow-ups after your visit        Your next 10 appointments already scheduled     Oct 26, 2018  8:45 AM CDT   Return Visit with Raz Cabrera MD   [facility] ([facility])    [address]  [place] [id number] [phone]              Who to contact     If you have questions or need follow up information about today's clinic visit or your schedule please contact [facility] directly at [phone].  Normal or non-critical lab and imaging results will be communicated to you by MyChart, letter or phone within 4 business days after the clinic has received the results. If you do not hear from us within 7 days, please contact the clinic through MyChart or phone. If you have a critical or abnormal lab result, we will notify you by phone as soon as possible.  Submit refill requests through [employer] or call your pharmacy and they will forward the refill request to us. Please allow 3 business days for your refill to be completed.          Additional Information About Your Visit        Care EveryWhere ID     This is your Care EveryWhere ID. This could be used by other organizations to access your [facility] medical records  [id number]        Your Vitals Were     Respirations BMI (Body Mass Index)                18 32.27 kg/m2           Blood Pressure from Last 3 Encounters:   08/31/18 110/72   05/29/18 108/60   05/08/18 130/70    Weight from Last 3 Encounters:   08/31/18 105 kg (231 lb 6.4 oz)   06/28/18 104.1 kg (229 lb 6.4 oz)   05/29/18 102.1 kg (225 lb)              Today, you had the following     No orders  found for display       Primary Care Provider Office Phone # Fax #    Ruben Valente [phone] [address]   [facility] [address]        Equal Access to Services     NAIN MARTINEZ : Hadii aad ku hadzaco Neetualokali, waglenysda luqadaha, qanavata kaalmada luh, malik genein hayaahaja mcphersonlurdessukhjinder wiley. So [facility] [phone].    ATENCIÓN: Si habla español, tiene a ambrose disposición servicios gratuitos de asistencia lingüística. Llame al [phone].    We comply with applicable federal civil rights laws and [place] laws. We do not discriminate on the basis of race, color, national origin, age, disability, sex, sexual orientation, or gender identity.            Thank you!     Thank you for choosing [facility] AND [facility]  for your care. Our goal is always to provide you with excellent care. Hearing back from our patients is one way we can continue to improve our services. Please take a few minutes to complete the written survey that you may receive in the mail after your visit with us. Thank you!             Your Updated Medication List - Protect others around you: Learn how to safely use, store and throw away your medicines at www.disposemymeds.org.      Notice  As of 8/31/2018 10:15 AM    You have not been prescribed any medications.

## 2018-08-31 NOTE — PROGRESS NOTES
Type of Visit  EST    Chief Complaint  Hypogonadism    HPI  Mr. Trent is a 38 y.o. male who follows up with hypogonadism.  Primary symptom of low energy started over 3 years ago.  He previously failed Axiron and short acting injections led to significant swings and testosterone level.  He is currently undergoing replacement with Aveed every 8 weeks.  This schedule has provided benefit for improved low energy.  No side effects.    He is actually 9 weeks from the last injection as there was a delay due to work schedule.  He has noticed some weight gain recently.  He is restarting an exercise regimen strict diet.      Review of Systems  I personally reviewed the ROS with the patient.    Nursing Notes:   Grazyna Glasgow, LPN  8/31/2018  8:54 AM  Signed  Pt presents to clinic for 10 week Aveed injection    Review of Systems:    Weight loss:    No     Recent fever/chills:  No   Night sweats:   No  Current skin rash:  No   Recent hair loss:  No  Heat intolerance:  No   Cold intolerance:  Yes  Chest pain:   Yes   Palpitations:   No  Shortness of breath:  Yes   Wheezing:   No  Constipation:    No   Diarrhea:   No   Nausea:   No   Vomiting:   No   Kidney/side pain:  Yes   Back pain:   Yes  Frequent headaches:  Yes   Dizziness:     No  Leg swelling:   Yes   Calf pain:    Yes    Pt states that he has cramps on both sides        Physical Exam  /72 (BP Location: Left arm, Patient Position: Sitting, Cuff Size: Adult Large)  Resp 18  Wt 105 kg (231 lb 6.4 oz)  BMI 32.27 kg/m2  Constitutional: No acute distress.  Alert and cooperative   Head: NCAT  Eyes: Conjunctivae normal  Cardiovascular: Regular rate.  Pulmonary/Chest: Respirations are even and non-labored bilaterally, no audible wheezing  Abdominal: Soft. No distension, tenderness, masses or guarding.   Neurological: A + O x 3.  Cranial Nerves II-XII grossly intact.  Extremities: BEST x 4, Warm. No clubbing.  No cyanosis.    Skin: Pink, warm and dry.  No visible  rashes noted.  Psychiatric:  Normal mood and affect  Back:  No left CVA tenderness.  No right CVA tenderness.  Genitourinary:  Nonpalpable bladder    Labs  Results for KENNETH TRENT (MRN [id number]) as of 4/26/2018 08:15   4/12/2018 08:50   Hematocrit 50.5   Testosterone Total 233       Results for KENNETH TRENT (MRN [id number]) as of 11/9/2017 12:39   8/10/2017 08:17 11/9/2017 11:40   TESTOSTERONE,TOTAL 250.3 181.1       Results for KENNETH TRENT (MRN [id number]) as of 12/19/2016 08:35   2/3/2016 17:06 12/14/2016 16:27   TSH 1.27    VITAMIN D TOTAL AFL 21.2    TESTOSTERONE FREE 74.00 (H) 4.49 (L)   TESTOSTERONE,TOTAL 2000 (H) 136 (L)       CREATININE (mg/dL)   Date Value   12/14/2016 1.09       Testosterone Injection  Today the patient received an injection of testosterone undecanoate 750mg.  This was given in the gluteus.  The results of this injection: None  Patient was monitored for 30 minutes following the injection.    Assessment  Mr. Trent is a 38 y.o. male with clinically symptomatic hypogonadism.  The new frequency has significantly improved his symptoms.  He is extremely satisfied with the new schedule.    Plan  Aveed 750mg IM every 8 weeks (labs 6 weeks after the next injection)

## 2018-10-02 ENCOUNTER — HOSPITAL ENCOUNTER (OUTPATIENT)
Dept: MRI IMAGING | Facility: OTHER | Age: 38
Discharge: HOME | End: 2018-10-02
Attending: CHIROPRACTOR | Admitting: FAMILY MEDICINE
Payer: COMMERCIAL

## 2018-10-02 ENCOUNTER — TRANSFERRED RECORDS (OUTPATIENT)
Dept: HEALTH INFORMATION MANAGEMENT | Facility: OTHER | Age: 38
End: 2018-10-02

## 2018-10-02 DIAGNOSIS — S33.5XXD SPRAIN OF LIGAMENTS OF LUMBAR SPINE, SUBSEQUENT ENCOUNTER: ICD-10-CM

## 2018-10-02 DIAGNOSIS — S23.3XXD SPRAIN OF LIGAMENTS OF THORACIC SPINE, SUBSEQUENT ENCOUNTER: ICD-10-CM

## 2018-10-02 PROCEDURE — 72141 MRI NECK SPINE W/O DYE: CPT

## 2018-10-24 ENCOUNTER — HOSPITAL ENCOUNTER (OUTPATIENT)
Dept: GENERAL RADIOLOGY | Facility: OTHER | Age: 38
Discharge: HOME | End: 2018-10-24
Attending: CHIROPRACTOR | Admitting: RADIOLOGY
Payer: COMMERCIAL

## 2018-10-24 ENCOUNTER — HOSPITAL ENCOUNTER (OUTPATIENT)
Dept: GENERAL RADIOLOGY | Facility: OTHER | Age: 38
End: 2018-10-24
Attending: CHIROPRACTOR
Payer: COMMERCIAL

## 2018-10-24 DIAGNOSIS — S33.100D SUBLUXATION OF UNSPECIFIED LUMBAR VERTEBRA, SUBSEQUENT ENCOUNTER: ICD-10-CM

## 2018-10-24 DIAGNOSIS — S23.100D: ICD-10-CM

## 2018-10-24 DIAGNOSIS — S13.100D: ICD-10-CM

## 2018-10-24 PROCEDURE — 62321 NJX INTERLAMINAR CRV/THRC: CPT

## 2018-10-24 PROCEDURE — G0463 HOSPITAL OUTPT CLINIC VISIT: HCPCS

## 2018-10-24 PROCEDURE — 25500064 ZZH RX 255 OP 636: Performed by: RADIOLOGY

## 2018-10-24 PROCEDURE — 25000125 ZZHC RX 250: Performed by: RADIOLOGY

## 2018-10-24 PROCEDURE — 25000128 H RX IP 250 OP 636: Performed by: RADIOLOGY

## 2018-10-24 RX ORDER — LIDOCAINE HYDROCHLORIDE 10 MG/ML
2 INJECTION, SOLUTION EPIDURAL; INFILTRATION; INTRACAUDAL; PERINEURAL ONCE
Status: DISCONTINUED | OUTPATIENT
Start: 2018-10-24 | End: 2018-10-25 | Stop reason: HOSPADM

## 2018-10-24 RX ORDER — BETAMETHASONE SODIUM PHOSPHATE AND BETAMETHASONE ACETATE 3; 3 MG/ML; MG/ML
12 INJECTION, SUSPENSION INTRA-ARTICULAR; INTRALESIONAL; INTRAMUSCULAR; SOFT TISSUE ONCE
Status: COMPLETED | OUTPATIENT
Start: 2018-10-24 | End: 2018-10-24

## 2018-10-24 RX ADMIN — IOHEXOL 2 ML: 240 INJECTION, SOLUTION INTRATHECAL; INTRAVASCULAR; INTRAVENOUS; ORAL at 12:18

## 2018-10-24 RX ADMIN — BETAMETHASONE SODIUM PHOSPHATE AND BETAMETHASONE ACETATE 12 MG: 3; 3 INJECTION, SUSPENSION INTRA-ARTICULAR; INTRALESIONAL; INTRAMUSCULAR at 12:18

## 2018-10-24 RX ADMIN — LIDOCAINE HYDROCHLORIDE 2 ML: 10 INJECTION, SOLUTION INFILTRATION; PERINEURAL at 12:19

## 2018-10-26 ENCOUNTER — OFFICE VISIT (OUTPATIENT)
Dept: UROLOGY | Facility: OTHER | Age: 38
End: 2018-10-26
Attending: FAMILY MEDICINE
Payer: COMMERCIAL

## 2018-10-26 VITALS
BODY MASS INDEX: 33.75 KG/M2 | RESPIRATION RATE: 12 BRPM | HEART RATE: 80 BPM | WEIGHT: 242 LBS | SYSTOLIC BLOOD PRESSURE: 138 MMHG | DIASTOLIC BLOOD PRESSURE: 70 MMHG

## 2018-10-26 DIAGNOSIS — E29.1 HYPOGONADISM MALE: Primary | ICD-10-CM

## 2018-10-26 PROCEDURE — 96372 THER/PROPH/DIAG INJ SC/IM: CPT

## 2018-10-26 PROCEDURE — 99213 OFFICE O/P EST LOW 20 MIN: CPT | Performed by: UROLOGY

## 2018-10-26 PROCEDURE — 25000128 H RX IP 250 OP 636: Performed by: UROLOGY

## 2018-10-26 RX ADMIN — TESTOSTERONE UNDECANOATE 750 MG: 250 INJECTION INTRAMUSCULAR at 09:06

## 2018-10-26 ASSESSMENT — PAIN SCALES - GENERAL: PAINLEVEL: MODERATE PAIN (5)

## 2018-10-26 NOTE — MR AVS SNAPSHOT
After Visit Summary   10/26/2018    Julio Trent    MRN: [id number]           Patient Information     Date Of Birth          1980        Visit Information        Provider Department      10/26/2018 8:45 AM Raz Cabrera MD [facility]        Today's Diagnoses     Hypogonadism male    -  1       Follow-ups after your visit        Your next 10 appointments already scheduled     Dec 07, 2018  8:50 AM CST   [facility]    [address] [facility] [id number] [phone]           Please do not eat 10-12 hours before your appointment if you are coming in fasting for labs on lipids, cholesterol, or glucose (sugar). This does not apply to pregnant women. Water, hot tea and black coffee (with nothing added) are okay. Do not drink other fluids, diet soda or chew gum.            Dec 20, 2018  8:15 AM CST   [facility]    [address]  [place] [id number] [phone]              Future tests that were ordered for you today     Open Future Orders        Priority Expected Expires Ordered    Hematocrit Routine 11/30/2018 10/25/2019 10/26/2018    Testosterone total Routine 11/30/2018 10/25/2019 10/26/2018            Who to contact     If you have questions or need follow up information about today's clinic visit or your schedule please contact [facility] directly at [phone].  Normal or non-critical lab and imaging results will be communicated to you by [email or web address], letter or phone within 4 business days after the clinic has received the results. If you do not hear from us within 7 days, please contact the clinic through [email or web address] or phone. If you have a critical or abnormal lab result, we will notify you by phone as soon as possible.  Submit refill requests through Sp[employer]eck  or call your pharmacy and they will forward the refill request to us. Please allow 3 business days for your refill to be completed.          Additional Information About Your Visit        Care EveryWhere ID     This is your Care EveryWhere ID. This could be used by other organizations to access your [facility] medical records  [id number]        Your Vitals Were     Pulse Respirations BMI (Body Mass Index)             80 12 33.75 kg/m2          Blood Pressure from Last 3 Encounters:   10/26/18 138/70   08/31/18 110/72   05/29/18 108/60    Weight from Last 3 Encounters:   10/26/18 109.8 kg (242 lb)   08/31/18 105 kg (231 lb 6.4 oz)   06/28/18 104.1 kg (229 lb 6.4 oz)               Primary Care Provider Office Phone # Fax #    Ruben Valente [phone]       [address] [facility] [address]        Equal Access to Services     [facility] : Hadii lise downso Rahat, waaxda lulilia, qaybta kaalmada luh, malik hubbard . So [facility] [phone].    ATENCIÓN: Si habla español, tiene a ambrose disposición servicios gratuitos de asistencia lingüística. Llame al [phone].    We comply with applicable federal civil rights laws and [place] laws. We do not discriminate on the basis of race, color, national origin, age, disability, sex, sexual orientation, or gender identity.            Thank you!     Thank you for choosing [facility] AND hospitals  for your care. Our goal is always to provide you with excellent care. Hearing back from our patients is one way we can continue to improve our services. Please take a few minutes to complete the written survey that you may receive in the mail after your visit with us. Thank you!             Your Updated Medication List - Protect others around you: Learn how to safely use, store and throw away your medicines at www.disposemymeds.org.      Notice  As of 10/26/2018 10:59 AM    You have not been prescribed any  medications.

## 2018-10-26 NOTE — PROGRESS NOTES
"Type of Visit  EST    Chief Complaint  Hypogonadism    HPI  Mr. Trent is a 38 y.o. male who follows up with hypogonadism.  Primary symptom of low energy started over 3 years ago.  He previously failed Axiron and short acting injections led to significant swings and testosterone level.  He is currently undergoing replacement with Aveed every 8 weeks.  This schedule has provided benefit for improved low energy.    Unfortunately he fell from the tailgate of his truck a few weeks ago.  The fall caused a neck injury.  He has been struggling with healing from this injury.  His work has suffered, he has gained some weight and he is concerned that this may be related to his testosterone level.  He denies side effects.      Review of Systems  I personally reviewed the ROS with the patient.    Nursing Notes:   Grazyna Glasgow LPN  10/26/2018  8:56 AM  Addendum  Pt presents to clinic for 8 week Aveed injection    Review of Systems:    Weight loss:    No     Recent fever/chills:  No   Night sweats:   No  Current skin rash:  No   Recent hair loss:  No  Heat intolerance:  No   Cold intolerance:  Yes  Chest pain:   No   Palpitations:   No  Shortness of breath:  No   Wheezing:   No  Constipation:    No   Diarrhea:   No   Nausea:   No   Vomiting:   No   Kidney/side pain:  No   Back pain:   Yes  Frequent headaches:  Yes   Dizziness:     No  Leg swelling:   No   Calf pain:    No    Patient given \"Aveed Treatment: A Patient Guide\" form.  Injection given and patient in clinic for 30 minutes after injection for monitoring.  Patient tolerated injection with no problems.  Grazyna Glasgow LPN.........10/26/2018...8:53 AM        Physical Exam  /70 (BP Location: Right arm, Patient Position: Sitting, Cuff Size: Adult Regular)  Pulse 80  Resp 12  Wt 109.8 kg (242 lb)  BMI 33.75 kg/m2  Constitutional: No acute distress.  Alert and cooperative   Head: NCAT  Eyes: Conjunctivae normal  Cardiovascular: Regular " rate.  Pulmonary/Chest: Respirations are even and non-labored bilaterally, no audible wheezing  Abdominal: Soft. No distension, tenderness, masses or guarding.   Neurological: A + O x 3.  Cranial Nerves II-XII grossly intact.  Extremities: BEST x 4, Warm. No clubbing.  No cyanosis.    Skin: Pink, warm and dry.  No visible rashes noted.  Psychiatric:  Normal mood and affect  Back:  No left CVA tenderness.  No right CVA tenderness.  Genitourinary:  Nonpalpable bladder    Labs  Results for KENNETH TRENT (MRN [id number]) as of 4/26/2018 08:15   4/12/2018 08:50   Hematocrit 50.5   Testosterone Total 233     Results for KENNETH TRENT (MRN [id number]) as of 11/9/2017 12:39   8/10/2017 08:17 11/9/2017 11:40   TESTOSTERONE,TOTAL 250.3 181.1     Results for KENNETH TRENT (MRN [id number]) as of 12/19/2016 08:35   2/3/2016 17:06 12/14/2016 16:27   TSH 1.27    VITAMIN D TOTAL AFL 21.2    TESTOSTERONE FREE 74.00 (H) 4.49 (L)   TESTOSTERONE,TOTAL 2000 (H) 136 (L)     Results for KENNETH TRENT (MRN [id number]) as of 10/26/2018 09:12   5/8/2018 17:10   Creatinine 1.18     Testosterone Injection  Today the patient received an injection of testosterone undecanoate 750mg.  This was given in the gluteus.  The results of this injection: None  Patient was monitored for 30 minutes following the injection.    Assessment  Mr. Trent is a 38 y.o. male with clinically symptomatic hypogonadism.  The new frequency has significantly improved his symptoms.  He has encountered an additional health issue (neck injury from a fall) in the last couple weeks which has complicated his ability to assess his symptoms.    Plan  Aveed 750mg IM every 8 weeks (labs in 6 weeks - hematocrit and T total)

## 2018-10-26 NOTE — NURSING NOTE
"Pt presents to clinic for 8 week Aveed injection    Review of Systems:    Weight loss:    No     Recent fever/chills:  No   Night sweats:   No  Current skin rash:  No   Recent hair loss:  No  Heat intolerance:  No   Cold intolerance:  Yes  Chest pain:   No   Palpitations:   No  Shortness of breath:  No   Wheezing:   No  Constipation:    No   Diarrhea:   No   Nausea:   No   Vomiting:   No   Kidney/side pain:  No   Back pain:   Yes  Frequent headaches:  Yes   Dizziness:     No  Leg swelling:   No   Calf pain:    No    Patient given \"Aveed Treatment: A Patient Guide\" form.  Injection given and patient in clinic for 30 minutes after injection for monitoring.  Patient tolerated injection with no problems.  Grazyna Glasgow LPN.........10/26/2018...8:53 AM    "

## 2018-10-29 ENCOUNTER — TELEPHONE (OUTPATIENT)
Dept: UROLOGY | Facility: OTHER | Age: 38
End: 2018-10-29

## 2018-10-29 NOTE — TELEPHONE ENCOUNTER
Per my conversation with Emelyn Enrique clinic cordinator the ventrual gluteal is the preferred site

## 2018-10-29 NOTE — TELEPHONE ENCOUNTER
Pt states that he has been cramping up in the spot injection was given.  Would like injection given in the glutinous stephan for next injection.  Writer explained that this is no longer a preferred site.  He would like writer to discuss with manager

## 2018-12-07 DIAGNOSIS — E29.1 HYPOGONADISM MALE: ICD-10-CM

## 2018-12-07 LAB
HCT VFR BLD AUTO: 53.6 % (ref 40–53)
TESTOST SERPL-MCNC: 316 NG/DL (ref 175–781)

## 2018-12-07 PROCEDURE — 84403 ASSAY OF TOTAL TESTOSTERONE: CPT | Performed by: UROLOGY

## 2018-12-07 PROCEDURE — 85014 HEMATOCRIT: CPT | Performed by: UROLOGY

## 2018-12-07 PROCEDURE — 36415 COLL VENOUS BLD VENIPUNCTURE: CPT | Performed by: UROLOGY

## 2018-12-21 ENCOUNTER — OFFICE VISIT (OUTPATIENT)
Dept: UROLOGY | Facility: OTHER | Age: 38
End: 2018-12-21
Attending: UROLOGY
Payer: COMMERCIAL

## 2018-12-21 VITALS
SYSTOLIC BLOOD PRESSURE: 138 MMHG | RESPIRATION RATE: 18 BRPM | WEIGHT: 232.8 LBS | BODY MASS INDEX: 32.47 KG/M2 | DIASTOLIC BLOOD PRESSURE: 98 MMHG

## 2018-12-21 DIAGNOSIS — E29.1 HYPOGONADISM MALE: Primary | ICD-10-CM

## 2018-12-21 DIAGNOSIS — E29.1 HYPOGONADISM IN MALE: Primary | ICD-10-CM

## 2018-12-21 PROCEDURE — 96372 THER/PROPH/DIAG INJ SC/IM: CPT

## 2018-12-21 PROCEDURE — 99213 OFFICE O/P EST LOW 20 MIN: CPT | Performed by: UROLOGY

## 2018-12-21 PROCEDURE — 25000128 H RX IP 250 OP 636: Performed by: UROLOGY

## 2018-12-21 RX ADMIN — TESTOSTERONE UNDECANOATE 750 MG: 250 INJECTION INTRAMUSCULAR at 08:28

## 2018-12-21 NOTE — PROGRESS NOTES
"Type of Visit  EST    Chief Complaint  Hypogonadism    HPI  Mr. Trent is a 38 y.o. male who follows up with hypogonadism.  Primary symptom of low energy started over 3 years ago.  He previously failed Axiron and short acting injections led to significant swings and testosterone level.  He is currently undergoing replacement with Aveed every 8 weeks.  This schedule has provided benefit for improved low energy.  He does voice concerns with the last 1-1-1/2 weeks of the cycle.  He notices his energy drops back to baseline.  He is asking if we can shorten the time between injections.  He did undergo labs 2 weeks ago which would have been 6 weeks into the 8-week cycle.      Review of Systems  I personally reviewed the ROS with the patient.    Nursing Notes:   Grazyna Glasgow LPN  12/21/2018  8:26 AM  Signed  Pt presents to clinic for his 8 Week Aveed shot    Review of Systems:    Weight loss:    No     Recent fever/chills:  No   Night sweats:   No  Current skin rash:  No   Recent hair loss:  No  Heat intolerance:  No   Cold intolerance:  Yes  Chest pain:   No   Palpitations:   No  Shortness of breath:  No   Wheezing:   No  Constipation:    No   Diarrhea:   No   Nausea:   No   Vomiting:   No   Kidney/side pain:  No   Back pain:   Yes  Frequent headaches:  Yes   Dizziness:     No  Leg swelling:   No   Calf pain:    Yes    Patient given \"Aveed Treatment: A Patient Guide\" form.  Injection given and patient in clinic for 30 minutes after injection for monitoring.  Patient tolerated injection with no problems.  Grazyna Glasgow LPN .........12/21/2018...8:09 AM      Prior to injection, verified patient identity using patient's name and date of birth.  Due to injection administration, patient instructed to remain in clinic for 15 minutes  afterwards, and to report any adverse reaction to me immediately.      Testosterone is in tamper evident bag with sales receipt from today: Yes     Check vial for refills remaining and " initiate refill request if no refills remain.     Administered testosterone medication in clinic.  Medication was paid for at pharmacy.        Drug Amount Wasted:  None.  Vial/Syringe: Single dose vial      Physical Exam  BP (!) 138/98 (BP Location: Right arm, Patient Position: Sitting, Cuff Size: Adult Large)   Resp 18   Wt 105.6 kg (232 lb 12.8 oz)   BMI 32.47 kg/m    Constitutional: No acute distress.  Alert and cooperative   Head: NCAT  Eyes: Conjunctivae normal  Cardiovascular: Regular rate.  Pulmonary/Chest: Respirations are even and non-labored bilaterally, no audible wheezing  Abdominal: Soft. No distension, tenderness, masses or guarding.   Neurological: A + O x 3.  Cranial Nerves II-XII grossly intact.  Extremities: BEST x 4, Warm. No clubbing.  No cyanosis.    Skin: Pink, warm and dry.  No visible rashes noted.  Psychiatric:  Normal mood and affect  Back:  No left CVA tenderness.  No right CVA tenderness.  Genitourinary:  Nonpalpable bladder    Labs  Results for KENNETH HAJI (MRN [id number]) as of 12/21/2018 08:26   12/7/2018 08:45   Testosterone Total 316   Hematocrit 53.6 (H)     Results for KENNETH HAJI (MRN [id number]) as of 4/26/2018 08:15   4/12/2018 08:50   Hematocrit 50.5   Testosterone Total 233     Results for KENNETH HAJI (MRN [id number]) as of 11/9/2017 12:39   8/10/2017 08:17 11/9/2017 11:40   TESTOSTERONE,TOTAL 250.3 181.1     Results for KENNETH HAJI (MRN [id number]) as of 12/19/2016 08:35   2/3/2016 17:06 12/14/2016 16:27   TSH 1.27    VITAMIN D TOTAL AFL 21.2    TESTOSTERONE FREE 74.00 (H) 4.49 (L)   TESTOSTERONE,TOTAL 2000 (H) 136 (L)     Results for KENNETH HAJI (MRN [id number]) as of 10/26/2018 09:12   5/8/2018 17:10   Creatinine 1.18     Testosterone Injection  Today the patient received an injection of testosterone undecanoate 750mg.  This was given in the gluteus.  The results of this injection: None  Patient was monitored for 30 minutes following the  injection.    Assessment  Mr. Trent is a 38 y.o. male with clinically symptomatic hypogonadism.  Labs today were reviewed and do support changing the frequency to every 7 weeks.    Plan  Aveed 750mg IM every 7 weeks

## 2018-12-21 NOTE — NURSING NOTE
"Pt presents to clinic for his 8 Week Aveed shot    Review of Systems:    Weight loss:    No     Recent fever/chills:  No   Night sweats:   No  Current skin rash:  No   Recent hair loss:  No  Heat intolerance:  No   Cold intolerance:  Yes  Chest pain:   No   Palpitations:   No  Shortness of breath:  No   Wheezing:   No  Constipation:    No   Diarrhea:   No   Nausea:   No   Vomiting:   No   Kidney/side pain:  No   Back pain:   Yes  Frequent headaches:  Yes   Dizziness:     No  Leg swelling:   No   Calf pain:    Yes    Patient given \"Aveed Treatment: A Patient Guide\" form.  Injection given and patient in clinic for 30 minutes after injection for monitoring.  Patient tolerated injection with no problems.  Grazyna Glasgow LPN .........12/21/2018...8:09 AM      Prior to injection, verified patient identity using patient's name and date of birth.  Due to injection administration, patient instructed to remain in clinic for 15 minutes  afterwards, and to report any adverse reaction to me immediately.      Testosterone is in tamper evident bag with sales receipt from today: Yes     Check vial for refills remaining and initiate refill request if no refills remain.     Administered testosterone medication in clinic.  Medication was paid for at pharmacy.        Drug Amount Wasted:  None.  Vial/Syringe: Single dose vial      "

## 2019-02-06 ENCOUNTER — NURSE TRIAGE (OUTPATIENT)
Dept: FAMILY MEDICINE | Facility: OTHER | Age: 39
End: 2019-02-06

## 2019-02-06 ENCOUNTER — OFFICE VISIT (OUTPATIENT)
Dept: FAMILY MEDICINE | Facility: OTHER | Age: 39
End: 2019-02-06
Attending: NURSE PRACTITIONER
Payer: COMMERCIAL

## 2019-02-06 ENCOUNTER — HOSPITAL ENCOUNTER (OUTPATIENT)
Dept: GENERAL RADIOLOGY | Facility: OTHER | Age: 39
Discharge: HOME | End: 2019-02-06
Attending: NURSE PRACTITIONER | Admitting: NURSE PRACTITIONER
Payer: COMMERCIAL

## 2019-02-06 VITALS
TEMPERATURE: 97.3 F | WEIGHT: 227.4 LBS | HEART RATE: 82 BPM | BODY MASS INDEX: 31.84 KG/M2 | DIASTOLIC BLOOD PRESSURE: 80 MMHG | RESPIRATION RATE: 16 BRPM | SYSTOLIC BLOOD PRESSURE: 120 MMHG | HEIGHT: 71 IN

## 2019-02-06 DIAGNOSIS — M50.30 DDD (DEGENERATIVE DISC DISEASE), CERVICAL: ICD-10-CM

## 2019-02-06 DIAGNOSIS — F41.1 GAD (GENERALIZED ANXIETY DISORDER): ICD-10-CM

## 2019-02-06 DIAGNOSIS — R07.89 OTHER CHEST PAIN: ICD-10-CM

## 2019-02-06 DIAGNOSIS — R07.89 OTHER CHEST PAIN: Primary | ICD-10-CM

## 2019-02-06 DIAGNOSIS — M62.838 MUSCLE SPASM: ICD-10-CM

## 2019-02-06 LAB
ANION GAP SERPL CALCULATED.3IONS-SCNC: 10 MMOL/L (ref 3–14)
BUN SERPL-MCNC: 22 MG/DL (ref 7–25)
CALCIUM SERPL-MCNC: 10.2 MG/DL (ref 8.6–10.3)
CHLORIDE SERPL-SCNC: 104 MMOL/L (ref 98–107)
CK SERPL-CCNC: 155 U/L (ref 30–223)
CO2 SERPL-SCNC: 24 MMOL/L (ref 21–31)
CREAT SERPL-MCNC: 1.23 MG/DL (ref 0.7–1.3)
GFR SERPL CREATININE-BSD FRML MDRD: 67 ML/MIN/{1.73_M2}
GLUCOSE SERPL-MCNC: 97 MG/DL (ref 70–105)
MAGNESIUM SERPL-MCNC: 2.1 MG/DL (ref 1.9–2.7)
POTASSIUM SERPL-SCNC: 4.4 MMOL/L (ref 3.5–5.1)
SODIUM SERPL-SCNC: 138 MMOL/L (ref 134–144)
TROPONIN I SERPL-MCNC: <0.03 UG/L (ref 0–0.03)

## 2019-02-06 PROCEDURE — 84484 ASSAY OF TROPONIN QUANT: CPT | Performed by: NURSE PRACTITIONER

## 2019-02-06 PROCEDURE — 82550 ASSAY OF CK (CPK): CPT | Performed by: NURSE PRACTITIONER

## 2019-02-06 PROCEDURE — 80048 BASIC METABOLIC PNL TOTAL CA: CPT | Performed by: NURSE PRACTITIONER

## 2019-02-06 PROCEDURE — 99214 OFFICE O/P EST MOD 30 MIN: CPT | Performed by: NURSE PRACTITIONER

## 2019-02-06 PROCEDURE — 93000 ELECTROCARDIOGRAM COMPLETE: CPT | Performed by: INTERNAL MEDICINE

## 2019-02-06 PROCEDURE — 93005 ELECTROCARDIOGRAM TRACING: CPT | Performed by: NURSE PRACTITIONER

## 2019-02-06 PROCEDURE — 71046 X-RAY EXAM CHEST 2 VIEWS: CPT

## 2019-02-06 PROCEDURE — 36415 COLL VENOUS BLD VENIPUNCTURE: CPT | Performed by: NURSE PRACTITIONER

## 2019-02-06 PROCEDURE — 83735 ASSAY OF MAGNESIUM: CPT | Performed by: NURSE PRACTITIONER

## 2019-02-06 ASSESSMENT — ENCOUNTER SYMPTOMS
MYALGIAS: 1
BACK PAIN: 1
NERVOUS/ANXIOUS: 1
NECK PAIN: 1
ARTHRALGIAS: 1

## 2019-02-06 ASSESSMENT — PATIENT HEALTH QUESTIONNAIRE - PHQ9
SUM OF ALL RESPONSES TO PHQ QUESTIONS 1-9: 5
5. POOR APPETITE OR OVEREATING: SEVERAL DAYS

## 2019-02-06 ASSESSMENT — PAIN SCALES - GENERAL: PAINLEVEL: MILD PAIN (2)

## 2019-02-06 ASSESSMENT — ANXIETY QUESTIONNAIRES
1. FEELING NERVOUS, ANXIOUS, OR ON EDGE: NOT AT ALL
3. WORRYING TOO MUCH ABOUT DIFFERENT THINGS: NOT AT ALL
5. BEING SO RESTLESS THAT IT IS HARD TO SIT STILL: NOT AT ALL
IF YOU CHECKED OFF ANY PROBLEMS ON THIS QUESTIONNAIRE, HOW DIFFICULT HAVE THESE PROBLEMS MADE IT FOR YOU TO DO YOUR WORK, TAKE CARE OF THINGS AT HOME, OR GET ALONG WITH OTHER PEOPLE: NOT DIFFICULT AT ALL
2. NOT BEING ABLE TO STOP OR CONTROL WORRYING: NOT AT ALL
6. BECOMING EASILY ANNOYED OR IRRITABLE: NOT AT ALL
GAD7 TOTAL SCORE: 2
7. FEELING AFRAID AS IF SOMETHING AWFUL MIGHT HAPPEN: SEVERAL DAYS

## 2019-02-06 ASSESSMENT — MIFFLIN-ST. JEOR: SCORE: 1983.61

## 2019-02-06 NOTE — NURSING NOTE
Patient presents to clinic today for concerns with abdominal cramping; cramping has been severe for the past 2 weeks. He is unable to stretch the cramping out and lay flat on his back. He feels that anxiety has been under control, uses an ki for meditation. He also has a muscle pain in the groining, generally noticeable after sex.     No LMP for male patient.  Medication Reconciliation: complete    Woody Tinoco LPN  2/6/2019 3:00 PM

## 2019-02-06 NOTE — TELEPHONE ENCOUNTER
"Patient called today c/o chest pain, upper abdominal cramping. Pt transferred to triage line. Triage protocol used: Chest pain (Adult) and Abdominal Pain-Upper (Adult):    Reason for Disposition    [1] Intermittent  chest pain or \"angina\" AND [2] increasing in severity or frequency  (Exception: pains lasting a few seconds)    Pain also present in shoulder(s) or arm(s) or jaw  (Exception: pain is clearly made worse by movement)    [1] Chest pain(s) lasting a few seconds AND [2] persists > 3 days    [1] MODERATE pain (e.g., interferes with normal activities) AND [2] comes and goes (cramps) AND [3] present > 24 hours  (Exception: pain with Vomiting or Diarrhea - see that Guideline)    Additional Information    Negative: Severe difficulty breathing (e.g., struggling for each breath, speaks in single words)    Negative: Difficult to awaken or acting confused (e.g., disoriented, slurred speech)    Negative: Shock suspected (e.g., cold/pale/clammy skin, too weak to stand, low BP, rapid pulse)    Negative: [1] Chest pain lasts > 5 minutes AND [2] history of heart disease  (i.e., heart attack, bypass surgery, angina, angioplasty, CHF; not just a heart murmur)    Negative: [1] Chest pain lasts > 5 minutes AND [2] described as crushing, pressure-like, or heavy    Negative: [1] Chest pain lasts > 5 minutes AND [2] age > 50    Negative: [1] Chest pain lasts > 5 minutes AND [2] age > 30 AND [3] at least one cardiac risk factor (i.e., hypertension, diabetes, obesity, smoker or strong family history of heart disease)    Negative: [1] Chest pain lasts > 5 minutes AND [2] not relieved with nitroglycerin    Negative: Passed out (i.e., lost consciousness, collapsed and was not responding)    Negative: Heart beating < 50 beats per minute OR > 140 beats per minute    Negative: Visible sweat on face or sweat dripping down face    Negative: Sounds like a life-threatening emergency to the triager    Negative: Followed a chest injury    " "Negative: SEVERE chest pain    Negative: Difficulty breathing    Negative: Dizziness or lightheadedness    Negative: Coughing up blood    Negative: Cocaine use within last 3 days    Negative: History of prior \"blood clot\" in leg or lungs (i.e., deep vein thrombosis, pulmonary embolism)    Negative: Recent illness requiring prolonged bedrest (i.e., immobilization)    Negative: Hip or leg fracture in past 2 months (e.g., had cast on leg or ankle)    Negative: Major surgery in the past month    Negative: Recent long-distance travel with prolonged time in car, bus, plane, or train (i.e., within past 2 weeks; 6 or  more hours duration)     Spends a lot of time in his car for work, but usually never greater than 1 hour at a time.    Negative: Chest pain lasts > 5 minutes (Exceptions: chest pain occurring > 3 days ago and now asymptomatic; same as previously diagnosed heartburn and has accompanying sour taste in mouth)    Negative: Taking a deep breath makes pain worse    Negative: Patient sounds very sick or weak to the triager    Negative: Fever > 100.5 F (38.1 C)    Negative: [1] Chest pain lasts > 5 minutes AND [2] occurred > 3 days ago (72 hours) AND [3] NO chest pain or cardiac symptoms now    Negative: Rash in same area as pain (may be described as \"small blisters\")    Negative: [1] Patient claims chest pain is same as previously diagnosed \"heartburn\" AND [2] describes burning in chest AND [3] accompanying sour taste in mouth    Negative: [1] Chest pain lasting <= 5 minutes AND [2] has not taken prescribed nitroglycerin    Negative: [1] Chest pain lasting <= 5 minutes AND [2] completely relieved by nitroglycerin    Negative: [1] Intermittent chest pain from \"angina\" AND [2] NO increase in severity or frequency    Negative: Chest pain(s) lasting a few seconds from coughing AND [2] persists > 3 days    Negative: Severe difficulty breathing (e.g., struggling for each breath, speaks in single words)    Negative: Shock " "suspected (e.g., cold/pale/clammy skin, too weak to stand, low BP, rapid pulse)    Negative: Difficult to awaken or acting confused  (e.g., disoriented, slurred speech)    Negative: Passed out (i.e., lost consciousness, collapsed and was not responding)    Negative: Visible sweat on face or sweat dripping down face    Negative: Sounds like a life-threatening emergency to the triager    Negative: Followed an abdomen (stomach) injury    Negative: [1] SEVERE pain (e.g., excruciating) AND [2] present > 1 hour    Negative: [1] Pain lasts > 10 minutes AND [2] age > 50    Negative: [1] Pain lasts > 10 minutes AND [2] age > 40 AND [3] associated chest, arm, neck, upper back or jaw pain    Negative: [1] Pain lasts > 10 minutes AND [2] age > 35 AND [3] at least one cardiac risk factor (i.e., hypertension, diabetes, obesity, smoker or strong family history of heart disease)    Negative: [1] Pain lasts > 10 minutes AND [2] history of heart disease (i.e., heart attack, bypass surgery, angina, angioplasty, CHF; not just a heart murmur)    Negative: [1] Pain lasts > 10 minutes AND [2] difficulty breathing    Negative: [1] Vomiting AND [2] contains red blood  (Exception: few streaks and only occurred once)    Negative: [1] Vomiting AND [2] contains black (\"coffee ground\") material    Negative: Blood in bowel movements  (Exception: Blood on surface of BM with constipation)    Negative: Black or tarry bowel movements  (Exception: chronic-unchanged  black-grey bowel movements AND is taking iron pills or Pepto-bismol)    Negative: [1] Pregnant > 24 weeks AND [2] hand or face swelling    Negative: Patient sounds very sick or weak to the triager    Negative: [1] MILD-MODERATE pain AND [2] constant AND [3] present > 2 hours    Negative: [1] Vomiting AND [2] contains bile (green color)    Negative: [1] Vomiting AND [2] abdomen looks much more swollen than usual    Negative: White of the eyes have turned yellow (i.e., jaundice)    Negative: " "Fever > 103 F (39.4 C)    Negative: [1] Fever > 101 F (38.3 C) AND [2] age > 60    Negative: [1] Fever > 101 F (38.3 C) AND [2] bedridden (e.g., nursing home patient, CVA, chronic illness, recovering from surgery)    Negative: [1] Fever > 100.5 F (38.1 C) AND [2] diabetes mellitus or weak immune system (e.g., HIV positive, cancer chemo, splenectomy, organ transplant, chronic steroids)    Answer Assessment - Initial Assessment Questions  1. LOCATION/RADIATION: \"Where does it hurt?\"  \"Does the pain go anywhere else?\" (e.g., into neck, jaw, arms, back)  Upper back, just below location where shoulder blades touch, to bottom of ribs. Along rib cage in the front (3rd rib up on left) and along side of rib cage, radiating up to left armpit (near lung/heart area).    Pt has neck issues and chronic weakness/numbness/shooting pain in arms. Pt feels these are due to pinched nerves and an accident he had in May.    3. ONSET: \"When did the chest pain begin?\" (Minutes, hours or days)   Chest pain specifically, started mostly last night. Describes like someone sqeezing him (has \"fought off similar feeling in the past due to anxiety\"), slight pressure around left nipple.    4. PATTERN \"Does the pain come and go, or has it been constant since it started?\"  \"Does it get worse with exertion?\"   Intermittent. Sometimes brought on with exertion, laying to sitting up, sitting to laying. Cramping is his biggest concern, feels his back muscles twitch, has to jump out of bed, to get muscle cramp to go away. Sometimes made better with change of position.    5. DURATION: \"How long does it last\" (e.g., seconds, minutes, hours)  Last night- 20-30 seconds at a time, 10 times through night. Reports cramping up 100 times a day (back, chest and upper abdomen)    6. SEVERITY: \"How bad is the pain?\"  (e.g., Scale 1-10; mild, moderate, or severe)  MODERATE (3-5): interferes with normal activities or occasionally awakens from sleep    7. CARDIAC RISK " "FACTORS: \"Do you have any history of heart problems or risk factors for heart disease?\"   BP elevated last 2 times in to doctor (rare for Patient), prediabetes diagnosed 2 years ago, re-started smoking in the past 2 months (1 month ago- 1 pack/day, has reduced to 5-6 tobacco cigarettes/day and trying to quit again), weight #230/built/just under 6'    Denies: prior heart attack    8. PULMONARY RISK FACTORS: \"Do you have any history of lung disease?\"  (e.g., blood clots in lung, asthma, emphysema, birth control pills)  Pt reports that he suffered from asthma as a child, and has somewhat grown     9. CAUSE: \"What do you think is causing the chest pain?\"  Unsure.    10. OTHER SYMPTOMS: \"Do you have any other symptoms?\" (e.g., dizziness, nausea, vomiting, sweating, fever, difficulty breathing, cough)  Denies: dizziness, N&V, constipation/diarrhea, urinary symptoms, fever, cough, dehydration.    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    Pt is worried about diabetes, reporting worsening vision, pain in legs. His father is diabetic, diabetes runs in his family and he was dx with prediabetes 2 years ago. Pt was prescribed Metformin, however, he elected to stop taking after researching online and it \"being made of rat poison.\"    Pt reports a cold with non-stop coughing that lasted 1 month and finished 2 weeks ago.    Protocols used: CHEST PAIN-ADULT-AH, ABDOMINAL PAIN - UPPER-ADULT-AH    Per triage protocol, it was recommended that Patient be further evaluated in the ED. Pt flat out refused and requested an office visit, preferably with Jolie Angel. Pt urged to call back or go to ED, if chest pain/body cramping increased in frequency, duration or severity, he develops SOB/difficulty breathing, fever or worsens. The patient indicates understanding of these issues and agrees with the plan. Pt transferred to scheduling " line:  Next 5 appointments (look out 90 days)    Feb 06, 2019  2:45 PM CST  Office Visit with DEION Avina CNP  [facility] ([facility]) [address]  [place] [address]  [phone]     FYI to Jolie Angel. Joy Middleton RN .............. 2/6/2019  10:35 AM

## 2019-02-06 NOTE — TELEPHONE ENCOUNTER
"S-(situation):   Pt called c/o pain/cramping in chest, back and upper abd. Pt triaged over phone.    B-(background):   Pt suffers from chronic neck/back issues and myalgia, hx fall, chronic weakness/numbness/shooting pain in arms (carpal tunnel). Pt reports a cold with non-stop coughing that lasted 1 month and finished 2 weeks ago.    Cardiac risk factors: BP elevated last 2 OV, prediabetes dx 2 yr ago, re-started smoking 2 mos. 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     A-(assessment):   Intermittent pain/cramping of upper back, just below where shoulder blades touch, to bottom of ribs and along rib cage in the front (3rd rib up on left) extending along side of rib cage, radiating up to left armpit (near lung/heart area). Describes like someone sqeezing him (has \"fought off similar feeling in the past due to anxiety\"), slight pressure around left nipple. Worst at night, brought on with change from lying down to sitting up and vice versa. Last night: was 20-30 sec/episode, 10 episodes; experiences about 100x/day. Pain rated 3-5/10; takes his breath away.    Denies: SOB, palpitations, sweating/fever, dizziness, cough, hx clot, N&V, diarrhea/constipation, urinary sx, heartburn, dehydration.    R-(recommendations):   Pt refused recommendation to be seen in ED. Pt urged to call back or go to ED, if chest pain/body cramping increased in frequency, duration or severity, he develops SOB/difficulty breathing, fever or worsens. The patient indicates understanding of these issues and agrees with the plan.      Appointment made with EVGENY, today at 2:45 PM.   Pt would also like to discuss diabetes. FYI to EVGENY.    Joy Middleton RN .............. 2/6/2019  11:06 AM          "

## 2019-02-07 NOTE — PROGRESS NOTES
Nursing Notes:   Woody Tinoco LPN  2/6/2019  3:25 PM  Signed  Patient presents to clinic today for concerns with abdominal cramping; cramping has been severe for the past 2 weeks. He is unable to stretch the cramping out and lay flat on his back. He feels that anxiety has been under control, uses an ki for meditation. He also has a muscle pain in the groining, generally noticeable after sex.     No LMP for male patient.  Medication Reconciliation: complete    Woody Tinoco LPN  2/6/2019 3:00 PM    Nursing note reviewed with patient.  Accurracy and completeness verified.   Mr. Trent is a 38 year old male who:  Patient presents with:  Abdominal Cramping      ICD-10-CM    1. Other chest pain R07.89 Troponin I     Basic Metabolic Panel     Magnesium     EKG 12-lead, tracing only     XR Chest 2 Views     CK Total     Magnesium     Basic Metabolic Panel     Troponin I     CK Total   2. Muscle spasm M62.838    3. DDD (degenerative disc disease), cervical M50.30    4. CHSATITY (generalized anxiety disorder) F41.1      HPI   Presents with his wife for bilateral anterior rib pain and feels like tight muscle spasms radiating towards his back occasionally his left nipple area  Reports chest mostly bilateral upper and mid ribs tightness feels like muscle spasms--denies any shortness of breath nausea radiation to his arm or his neck  Or any presyncopal or fatigue symptoms--- denies any nausea or epigastric pain  This is been going on for some time but is worsened over the last couple of weeks--he does have cervical degenerative disc disease  Had an injection last November which was effective--would like to try another  He has been seeing his chiropractor Dr. Ornelas who has been treating him with physical therapy and adjustments  He does not want to take any medications if possible--he has had bad reactions to Flexeril and benzodiazepines for muscle relaxants    He has been doing some meditation therapy to manage his anxiety    He does  not smoke tobacco--had a pre-diabetes level A1c last year ended office visit which concerns him as his father has diabetes but is insulin-dependent          Review of Systems   Cardiovascular: Positive for chest pain.   Musculoskeletal: Positive for arthralgias, back pain, myalgias and neck pain.   Psychiatric/Behavioral: The patient is nervous/anxious.    All other systems reviewed and are negative.     All other systems reviewed and negative.     CHASTITY:   CHASTITY-7 SCORE 2/3/2016 5/8/2018 2/6/2019   Total Score 14 5 2     PHQ9:  PHQ-9 SCORE 9/22/2017 5/8/2018 2/6/2019   PHQ-9 Total Score 0 8 5       I have personally reviewed the past medical history, past surgical history, medications, allergies, family and social history as listed below, on 2/6/2019.    Allergies   Allergen Reactions     Iron Rash       No current outpatient medications on file.        Patient Active Problem List    Diagnosis Date Noted     Hypogonadism male 12/19/2016     Priority: Medium     Bilateral carpal tunnel syndrome 12/14/2016     Priority: Medium     Decreased sex drive 12/14/2016     Priority: Medium     Elevated random blood glucose level 12/14/2016     Priority: Medium     Malaise and fatigue 12/14/2016     Priority: Medium     Mixed hyperlipidemia 12/14/2016     Priority: Medium     Myalgia 12/14/2016     Priority: Medium     Prediabetes 12/14/2016     Priority: Medium     Sleep difficulties 12/14/2016     Priority: Medium     Vitamin D deficiency 12/14/2016     Priority: Medium     Moderate anxiety 02/03/2016     Priority: Medium     Moderate major depression (H) 02/03/2016     Priority: Medium     Past Medical History:   Diagnosis Date     Anxiety disorder     2/3/2016     Endocrine disorder     2/3/2016     Major depressive disorder, single episode, moderate (H)     2/3/2016     Past Surgical History:   Procedure Laterality Date     ARTHROSCOPY KNEE      No Comments Provided     OTHER SURGICAL HISTORY      [id number],OTHER     OTHER  "SURGICAL HISTORY      2005,[id number],OTHER,Bilateral,due to gynecomastia     Social History     Socioeconomic History     Marital status:      Spouse name: None     Number of children: None     Years of education: None     Highest education level: None   Social Needs     Financial resource strain: None     Food insecurity - worry: None     Food insecurity - inability: None     Transportation needs - medical: None     Transportation needs - non-medical: None   Occupational History     None   Tobacco Use     Smoking status: Current Every Day Smoker     Types: Cigarettes     Smokeless tobacco: Former User   Substance and Sexual Activity     Alcohol use: Yes     Alcohol/week: 0.0 oz     Comment: Alcoholic Drinks/day: socially     Drug use: No     Comment: Drug use: YesHx of Cocaine, Methampetamine use - about age [age]     Sexual activity: Yes     Partners: Female   Other Topics Concern     Parent/sibling w/ CABG, MI or angioplasty before 65F 55M? Not Asked   Social History Narrative     -- Wife Evelyn. 5 yo daughter Ember  12 yo step daughter - Tree    Hx of boat / car sales.     Family History   Problem Relation Age of Onset     Diabetes Father         Diabetes     Hypertension Father         Hypertension     Other - See Comments Mother         No Known Problems     Skin Cancer Sister         Skin cancer     Anxiety Disorder Sister         Anxiety disorder       EXAM:   Vitals:    02/06/19 1505   BP: 120/80   BP Location: Right arm   Patient Position: Sitting   Cuff Size: Adult Regular   Pulse: 82   Resp: 16   Temp: 97.3  F (36.3  C)   TempSrc: Tympanic   Weight: 103.1 kg (227 lb 6.4 oz)   Height: 1.803 m (5' 11\")       Current Pain Score: Mild Pain (2)     BP Readings from Last 3 Encounters:   02/06/19 120/80   12/21/18 (!) 138/98   10/26/18 138/70      Wt Readings from Last 3 Encounters:   02/06/19 103.1 kg (227 lb 6.4 oz)   12/21/18 105.6 kg (232 lb 12.8 oz)   10/26/18 109.8 kg (242 lb)      Estimated " "body mass index is 31.72 kg/m  as calculated from the following:    Height as of this encounter: 1.803 m (5' 11\").    Weight as of this encounter: 103.1 kg (227 lb 6.4 oz).     Physical Exam   Constitutional: He is oriented to person, place, and time. He appears well-developed and well-nourished.   HENT:   Head: Normocephalic and atraumatic.   Cardiovascular: Normal rate and regular rhythm.   Pulmonary/Chest: Effort normal and breath sounds normal.   Abdominal: Soft.   Musculoskeletal: Normal range of motion.   Bilateral anterior upper to mid ribs generalized tenderness  Palpable muscle spasms  Range of motion intact -normal gait and balance  No reproducible chest or cervical tenderness   Neurological: He is alert and oriented to person, place, and time.   Skin: Skin is warm and dry.   Psychiatric: He has a normal mood and affect. His behavior is normal. Judgment and thought content normal.   Nursing note and vitals reviewed.      INVESTIGATIONS:  Results for orders placed or performed in visit on 02/06/19   Magnesium   Result Value Ref Range    Magnesium 2.1 1.9 - 2.7 mg/dL   Basic Metabolic Panel   Result Value Ref Range    Sodium 138 134 - 144 mmol/L    Potassium 4.4 3.5 - 5.1 mmol/L    Chloride 104 98 - 107 mmol/L    Carbon Dioxide 24 21 - 31 mmol/L    Anion Gap 10 3 - 14 mmol/L    Glucose 97 70 - 105 mg/dL    Urea Nitrogen 22 7 - 25 mg/dL    Creatinine 1.23 0.70 - 1.30 mg/dL    GFR Estimate 67 >60 mL/min/[1.73_m2]    GFR Estimate If Black 81 >60 mL/min/[1.73_m2]    Calcium 10.2 8.6 - 10.3 mg/dL   Troponin I   Result Value Ref Range    Troponin I ES <0.030 0.000 - 0.034 ug/L   CK Total   Result Value Ref Range    CK Total 155 30 - 223 U/L       ASSESSMENT AND PLAN:  Problem List Items Addressed This Visit     None      Visit Diagnoses     Other chest pain    -  Primary    Relevant Orders    Troponin I (Completed)    Basic Metabolic Panel (Completed)    Magnesium (Completed)    EKG 12-lead, tracing only    XR " Chest 2 Views (Completed)    CK Total (Completed)    Muscle spasm        DDD (degenerative disc disease), cervical        CHASTITY (generalized anxiety disorder)            Low level of suspicion for cardiac etiology--patient wanted labs checked to be sure his electrolytes magnesium or calcium were not causing his muscle spasms  I believe this is anxiety postural related--with chronic underlying cervical and probable thoracic degenerative disc disease     I recommended he see his chiropractor today or tomorrow--his chiropractor graciously saw him immediately after clinic appointment    I contacted Julio to review his normal labs x-ray and other findings  He plans to work with his chiropractor tomorrow--- we discussed trying a Medrol pack for inflammation--- he will discuss this with his chiropractor    He has started testosterone injections--- uncertain if there are any musculoskeletal side effects that may be related ?    I suggested he talk with Dr. Deborah Kim is not really interested in trying anything daily for control of anxiety  Suggested that he see his therapist again--he does not feel that this is necessary and will use his meditation program for self-management      -- Expected clinical course discussed    -- Medications and their side effects discussed    There are no Patient Instructions on file for this visit.  Jolie Angel CNP  [facility]     Portions of this note were dictated using speech recognition software. The note has been proofread but errors in the text may have been overlooked. Please contact me if there are any concerns regarding the accuracy of the dictation.

## 2019-02-08 ASSESSMENT — ANXIETY QUESTIONNAIRES: GAD7 TOTAL SCORE: 2

## 2019-02-11 ENCOUNTER — OFFICE VISIT (OUTPATIENT)
Dept: UROLOGY | Facility: OTHER | Age: 39
End: 2019-02-11
Attending: UROLOGY
Payer: COMMERCIAL

## 2019-02-11 VITALS
BODY MASS INDEX: 31.63 KG/M2 | RESPIRATION RATE: 16 BRPM | DIASTOLIC BLOOD PRESSURE: 80 MMHG | WEIGHT: 226.8 LBS | SYSTOLIC BLOOD PRESSURE: 132 MMHG

## 2019-02-11 DIAGNOSIS — E29.1 HYPOGONADISM MALE: Primary | ICD-10-CM

## 2019-02-11 DIAGNOSIS — E29.1 HYPOGONADISM IN MALE: Primary | ICD-10-CM

## 2019-02-11 PROCEDURE — 25000128 H RX IP 250 OP 636: Performed by: UROLOGY

## 2019-02-11 PROCEDURE — 96372 THER/PROPH/DIAG INJ SC/IM: CPT | Performed by: UROLOGY

## 2019-02-11 PROCEDURE — 99213 OFFICE O/P EST LOW 20 MIN: CPT | Performed by: UROLOGY

## 2019-02-11 RX ADMIN — TESTOSTERONE UNDECANOATE 750 MG: 250 INJECTION INTRAMUSCULAR at 09:00

## 2019-02-11 ASSESSMENT — PAIN SCALES - GENERAL: PAINLEVEL: MILD PAIN (3)

## 2019-02-11 NOTE — PROGRESS NOTES
"Type of Visit  EST    Chief Complaint  Hypogonadism    HPI  Mr. Trent is a 38 y.o. male who follows up with hypogonadism.  Primary symptom of low energy started over 3 years ago.  He previously failed Axiron and short acting injections led to significant swings and testosterone level.  He is currently undergoing replacement with Aveed every 7 weeks.  The frequency has been adjusted based on symptoms.  Most recent labs did support a 7-week interval.  This new schedule has provided benefit for improved low energy.  He denies side effects.      Review of Systems  I personally reviewed the ROS with the patient.    Nursing Notes:   Grazyna Glasgow LPN  2/11/2019  9:14 AM  Signed  Pt presents to clinic for his 8 week Aveed shot    Review of Systems:    Weight loss:    No     Recent fever/chills:  No   Night sweats:   Yes  Current skin rash:  No   Recent hair loss:  No  Heat intolerance:  No   Cold intolerance:  Yes  Chest pain:   Yes   Palpitations:   No  Shortness of breath:  Yes   Wheezing:   No  Constipation:    No   Diarrhea:   No   Nausea:   No   Vomiting:   No   Kidney/side pain:  Yes   Back pain:   Yes   Frequent headaches:  No   Dizziness:     No  Leg swelling:   No   Calf pain:    No  Patient given \"Aveed Treatment: A Patient Guide\" form.  Injection given and patient in clinic for 30 minutes after injection for monitoring.  Patient tolerated injection with no problems.  Grazyna Glasgow LPN .........2/11/2019...9:00 AM      Physical Exam  /80 (BP Location: Left arm, Patient Position: Sitting, Cuff Size: Adult Regular)   Resp 16   Wt 102.9 kg (226 lb 12.8 oz)   BMI 31.63 kg/m    Constitutional: No acute distress.  Alert and cooperative   Head: NCAT  Eyes: Conjunctivae normal  Cardiovascular: Regular rate.  Pulmonary/Chest: Respirations are even and non-labored bilaterally, no audible wheezing  Abdominal: Soft. No distension, tenderness, masses or guarding.   Neurological: A + O x 3.  Cranial Nerves " II-XII grossly intact.  Extremities: BEST x 4, Warm. No clubbing.  No cyanosis.    Skin: Pink, warm and dry.  No visible rashes noted.  Psychiatric:  Normal mood and affect  Back:  No left CVA tenderness.  No right CVA tenderness.  Genitourinary:  Nonpalpable bladder    Labs  Results for KENNETH TRENT (MRN [id number]) as of 12/21/2018 08:26   12/7/2018 08:45   Testosterone Total 316   Hematocrit 53.6 (H)     Results for KENNETH TRENT (MRN [id number]) as of 4/26/2018 08:15   4/12/2018 08:50   Hematocrit 50.5   Testosterone Total 233     Results for KENNETH TRENT (MRN [id number]) as of 11/9/2017 12:39   8/10/2017 08:17 11/9/2017 11:40   TESTOSTERONE,TOTAL 250.3 181.1     Results for KENNETH TRENT (MRN [id number]) as of 12/19/2016 08:35   2/3/2016 17:06 12/14/2016 16:27   TSH 1.27    VITAMIN D TOTAL AFL 21.2    TESTOSTERONE FREE 74.00 (H) 4.49 (L)   TESTOSTERONE,TOTAL 2000 (H) 136 (L)     Results for KENNETH TRENT (MRN [id number]) as of 10/26/2018 09:12   5/8/2018 17:10   Creatinine 1.18     Testosterone Injection  Today the patient received an injection of testosterone undecanoate 750mg.  This was given in the gluteus.  The results of this injection: None  Patient was monitored for 30 minutes following the injection.    Assessment  Mr. Trent is a 38 y.o. male with clinically symptomatic hypogonadism.  Labs in 2 more cycles    Plan  Aveed 750mg IM every 7 weeks

## 2019-02-11 NOTE — NURSING NOTE
"Pt presents to clinic for his 8 week Aveed shot    Review of Systems:    Weight loss:    No     Recent fever/chills:  No   Night sweats:   Yes  Current skin rash:  No   Recent hair loss:  No  Heat intolerance:  No   Cold intolerance:  Yes  Chest pain:   Yes   Palpitations:   No  Shortness of breath:  Yes   Wheezing:   No  Constipation:    No   Diarrhea:   No   Nausea:   No   Vomiting:   No   Kidney/side pain:  Yes   Back pain:   Yes   Frequent headaches:  No   Dizziness:     No  Leg swelling:   No   Calf pain:    No  Patient given \"Aveed Treatment: A Patient Guide\" form.  Injection given and patient in clinic for 30 minutes after injection for monitoring.  Patient tolerated injection with no problems.  Grazyna Glasgow LPN .........2/11/2019...9:00 AM      "

## 2019-02-13 ENCOUNTER — HOSPITAL ENCOUNTER (OUTPATIENT)
Dept: GENERAL RADIOLOGY | Facility: OTHER | Age: 39
Discharge: HOME | End: 2019-02-13
Attending: CHIROPRACTOR | Admitting: CHIROPRACTOR
Payer: COMMERCIAL

## 2019-02-13 DIAGNOSIS — S13.100D: ICD-10-CM

## 2019-02-13 DIAGNOSIS — S23.100D: ICD-10-CM

## 2019-02-13 DIAGNOSIS — S33.100D SUBLUXATION OF UNSPECIFIED LUMBAR VERTEBRA, SUBSEQUENT ENCOUNTER: ICD-10-CM

## 2019-02-13 PROCEDURE — 25500064 ZZH RX 255 OP 636: Performed by: RADIOLOGY

## 2019-02-13 PROCEDURE — 62321 NJX INTERLAMINAR CRV/THRC: CPT

## 2019-02-13 PROCEDURE — 25000125 ZZHC RX 250: Performed by: RADIOLOGY

## 2019-02-13 RX ORDER — LIDOCAINE HYDROCHLORIDE 10 MG/ML
10 INJECTION, SOLUTION INFILTRATION; PERINEURAL ONCE
Status: COMPLETED | OUTPATIENT
Start: 2019-02-13 | End: 2019-02-13

## 2019-02-13 RX ORDER — METHYLPREDNISOLONE ACETATE 80 MG/ML
80 INJECTION, SUSPENSION INTRA-ARTICULAR; INTRALESIONAL; INTRAMUSCULAR; SOFT TISSUE ONCE
Status: COMPLETED | OUTPATIENT
Start: 2019-02-13 | End: 2019-02-13

## 2019-02-13 RX ADMIN — METHYLPREDNISOLONE ACETATE 80 MG: 80 INJECTION, SUSPENSION INTRA-ARTICULAR; INTRALESIONAL; INTRAMUSCULAR; SOFT TISSUE at 14:17

## 2019-02-13 RX ADMIN — LIDOCAINE HYDROCHLORIDE 2 ML: 10 INJECTION, SOLUTION INFILTRATION; PERINEURAL at 14:17

## 2019-02-13 RX ADMIN — IOHEXOL 2 ML: 240 INJECTION, SOLUTION INTRATHECAL; INTRAVASCULAR; INTRAVENOUS; ORAL at 14:17

## 2019-02-19 ENCOUNTER — OFFICE VISIT (OUTPATIENT)
Dept: FAMILY MEDICINE | Facility: OTHER | Age: 39
End: 2019-02-19
Attending: NURSE PRACTITIONER
Payer: COMMERCIAL

## 2019-02-19 VITALS
TEMPERATURE: 98.1 F | RESPIRATION RATE: 16 BRPM | WEIGHT: 230.4 LBS | HEART RATE: 84 BPM | SYSTOLIC BLOOD PRESSURE: 138 MMHG | DIASTOLIC BLOOD PRESSURE: 92 MMHG | BODY MASS INDEX: 32.26 KG/M2 | HEIGHT: 71 IN

## 2019-02-19 DIAGNOSIS — M54.2 NECK AND SHOULDER PAIN: ICD-10-CM

## 2019-02-19 DIAGNOSIS — M62.838 MUSCLE SPASM: Primary | ICD-10-CM

## 2019-02-19 DIAGNOSIS — M25.519 NECK AND SHOULDER PAIN: ICD-10-CM

## 2019-02-19 PROCEDURE — 99213 OFFICE O/P EST LOW 20 MIN: CPT | Performed by: NURSE PRACTITIONER

## 2019-02-19 RX ORDER — METAXALONE 800 MG/1
TABLET ORAL
Qty: 30 TABLET | Refills: 0 | Status: SHIPPED | OUTPATIENT
Start: 2019-02-19 | End: 2019-04-23

## 2019-02-19 ASSESSMENT — PAIN SCALES - GENERAL: PAINLEVEL: MODERATE PAIN (5)

## 2019-02-19 ASSESSMENT — MIFFLIN-ST. JEOR: SCORE: 1997.22
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
## 2019-02-21 ENCOUNTER — HOSPITAL ENCOUNTER (OUTPATIENT)
Dept: PHYSICAL THERAPY | Facility: OTHER | Age: 39
Setting detail: THERAPIES SERIES
End: 2019-02-21
Attending: NURSE PRACTITIONER
Payer: COMMERCIAL

## 2019-02-21 DIAGNOSIS — M54.2 NECK AND SHOULDER PAIN: ICD-10-CM

## 2019-02-21 DIAGNOSIS — M25.519 NECK AND SHOULDER PAIN: ICD-10-CM

## 2019-02-21 DIAGNOSIS — M62.838 MUSCLE SPASM: ICD-10-CM

## 2019-02-21 PROCEDURE — 97140 MANUAL THERAPY 1/> REGIONS: CPT | Mod: GP

## 2019-02-21 PROCEDURE — 97110 THERAPEUTIC EXERCISES: CPT | Mod: GP

## 2019-02-21 PROCEDURE — 97161 PT EVAL LOW COMPLEX 20 MIN: CPT | Mod: GP
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
## 2019-02-24 ASSESSMENT — ENCOUNTER SYMPTOMS
MYALGIAS: 1
NECK PAIN: 1
BACK PAIN: 1
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
## 2019-03-05 ENCOUNTER — HOSPITAL ENCOUNTER (OUTPATIENT)
Dept: PHYSICAL THERAPY | Facility: OTHER | Age: 39
Setting detail: THERAPIES SERIES
End: 2019-03-05
Attending: NURSE PRACTITIONER
Payer: COMMERCIAL

## 2019-03-05 PROCEDURE — 97140 MANUAL THERAPY 1/> REGIONS: CPT | Mod: GP

## 2019-03-05 PROCEDURE — 97110 THERAPEUTIC EXERCISES: CPT | Mod: GP

## 2019-03-11 ENCOUNTER — HOSPITAL ENCOUNTER (OUTPATIENT)
Dept: PHYSICAL THERAPY | Facility: OTHER | Age: 39
Setting detail: THERAPIES SERIES
End: 2019-03-11
Attending: NURSE PRACTITIONER
Payer: COMMERCIAL

## 2019-03-11 PROCEDURE — 97112 NEUROMUSCULAR REEDUCATION: CPT | Mod: GP

## 2019-03-11 PROCEDURE — 97140 MANUAL THERAPY 1/> REGIONS: CPT | Mod: GP

## 2019-03-13 ENCOUNTER — HOSPITAL ENCOUNTER (OUTPATIENT)
Dept: PHYSICAL THERAPY | Facility: OTHER | Age: 39
Setting detail: THERAPIES SERIES
End: 2019-03-13
Attending: NURSE PRACTITIONER
Payer: COMMERCIAL

## 2019-03-13 PROCEDURE — 97140 MANUAL THERAPY 1/> REGIONS: CPT | Mod: GP

## 2019-03-13 PROCEDURE — 97112 NEUROMUSCULAR REEDUCATION: CPT | Mod: GP

## 2019-03-14 DIAGNOSIS — M62.838 CERVICAL PARASPINOUS MUSCLE SPASM: Primary | ICD-10-CM

## 2019-03-18 ENCOUNTER — HOSPITAL ENCOUNTER (OUTPATIENT)
Dept: PHYSICAL THERAPY | Facility: OTHER | Age: 39
Setting detail: THERAPIES SERIES
End: 2019-03-18
Attending: NURSE PRACTITIONER
Payer: COMMERCIAL

## 2019-03-18 PROCEDURE — 97140 MANUAL THERAPY 1/> REGIONS: CPT | Mod: GP

## 2019-03-18 PROCEDURE — 97112 NEUROMUSCULAR REEDUCATION: CPT | Mod: GP

## 2019-04-01 ENCOUNTER — OFFICE VISIT (OUTPATIENT)
Dept: UROLOGY | Facility: OTHER | Age: 39
End: 2019-04-01
Attending: UROLOGY
Payer: COMMERCIAL

## 2019-04-01 VITALS — WEIGHT: 235 LBS | HEART RATE: 75 BPM | BODY MASS INDEX: 32.78 KG/M2 | RESPIRATION RATE: 18 BRPM

## 2019-04-01 DIAGNOSIS — E29.1 HYPOGONADISM IN MALE: Primary | ICD-10-CM

## 2019-04-01 DIAGNOSIS — E29.1 HYPOGONADISM MALE: Primary | ICD-10-CM

## 2019-04-01 PROCEDURE — 99213 OFFICE O/P EST LOW 20 MIN: CPT | Performed by: UROLOGY

## 2019-04-01 PROCEDURE — 96372 THER/PROPH/DIAG INJ SC/IM: CPT

## 2019-04-01 PROCEDURE — 25000128 H RX IP 250 OP 636: Performed by: UROLOGY

## 2019-04-01 RX ADMIN — TESTOSTERONE UNDECANOATE 750 MG: 250 INJECTION INTRAMUSCULAR at 09:08

## 2019-04-01 ASSESSMENT — PAIN SCALES - GENERAL: PAINLEVEL: MODERATE PAIN (5)

## 2019-04-01 NOTE — PROGRESS NOTES
Type of Visit  EST    Chief Complaint  Hypogonadism    HPI  Mr. Trent is a 38 y.o. male who follows up with hypogonadism.  Primary symptom of low energy started over 3 years ago.  He previously failed Axiron and short acting injections led to significant swings and testosterone level.  He is currently undergoing replacement with Aveed every 7 weeks.  The frequency has been adjusted based on symptoms.  Most recent labs did support a 7-week interval.  He is very satisfied with this new regimen of dosing every 7 weeks.  He feels significantly improved for over 6 weeks during the cycle.  He denies side effects such as acne and mood swings.  He presents today for treatment.      Review of Systems  I personally reviewed the ROS with the patient.    Nursing Notes:   Yue Davison RN  4/1/2019  9:00 AM  Signed  Review of Systems:    Weight loss:    No     Recent fever/chills:  No   Night sweats:   No  Current skin rash:  No   Recent hair loss:  No  Heat intolerance:  No   Cold intolerance:  Yes  Chest pain:   No   Palpitations:   No  Shortness of breath:  No   Wheezing:   No  Constipation:    No   Diarrhea:   No   Nausea:   No   Vomiting:   No   Kidney/side pain:  No   Back pain:   Yes  Frequent headaches:  Yes   Dizziness:     No  Leg swelling:   Yes   Calf pain:    Yes      Yue Davison RN on 4/1/2019 at 9:00 AM        Physical Exam  Pulse 75   Resp 18   Wt 106.6 kg (235 lb)   BMI 32.78 kg/m    Constitutional: No acute distress.  Alert and cooperative   Head: NCAT  Eyes: Conjunctivae normal  Cardiovascular: Regular rate.  Pulmonary/Chest: Respirations are even and non-labored bilaterally, no audible wheezing  Abdominal: Soft. No distension, tenderness, masses or guarding.   Neurological: A + O x 3.  Cranial Nerves II-XII grossly intact.  Extremities: BEST x 4, Warm. No clubbing.  No cyanosis.    Skin: Pink, warm and dry.  No visible rashes noted.  Psychiatric:  Normal mood and affect  Back:  No left CVA  tenderness.  No right CVA tenderness.  Genitourinary:  Nonpalpable bladder    Labs  Results for KENNETH TRENT (MRN [id number]) as of 12/21/2018 08:26   12/7/2018 08:45   Testosterone Total 316   Hematocrit 53.6 (H)     Results for KENNETH TRENT (MRN [id number]) as of 4/26/2018 08:15   4/12/2018 08:50   Hematocrit 50.5   Testosterone Total 233     Results for KENNETH TRENT (MRN [id number]) as of 11/9/2017 12:39   8/10/2017 08:17 11/9/2017 11:40   TESTOSTERONE,TOTAL 250.3 181.1     Results for KENNETH TRENT (MRN [id number]) as of 12/19/2016 08:35   2/3/2016 17:06 12/14/2016 16:27   TSH 1.27    VITAMIN D TOTAL AFL 21.2    TESTOSTERONE FREE 74.00 (H) 4.49 (L)   TESTOSTERONE,TOTAL 2000 (H) 136 (L)     Results for KENNETH TRENT (MRN [id number]) as of 10/26/2018 09:12   5/8/2018 17:10   Creatinine 1.18     Testosterone Injection  Today the patient received an injection of testosterone undecanoate 750mg.  This was given in the gluteus.  The results of this injection: None  Patient was monitored for 30 minutes following the injection.    Assessment  Mr. Trent is a 38 y.o. male with clinically symptomatic hypogonadism.  Labs in 1 more cycle    Plan  Labs 5 weeks after the next injection  Aveed 750mg IM every 7 weeks

## 2019-04-01 NOTE — NURSING NOTE
"Patient given \"Aveed Treatment: A Patient Guide\" form.  Injection given and patient in clinic for 30 minutes after injection for monitoring. Patient tolerated injection with no problems.      Yue Davison RN on 4/1/2019 at 9:11 AM    "

## 2019-04-01 NOTE — NURSING NOTE
Review of Systems:    Weight loss:    No     Recent fever/chills:  No   Night sweats:   No  Current skin rash:  No   Recent hair loss:  No  Heat intolerance:  No   Cold intolerance:  Yes  Chest pain:   No   Palpitations:   No  Shortness of breath:  No   Wheezing:   No  Constipation:    No   Diarrhea:   No   Nausea:   No   Vomiting:   No   Kidney/side pain:  No   Back pain:   Yes  Frequent headaches:  Yes   Dizziness:     No  Leg swelling:   Yes   Calf pain:    Yes      Yue Davison RN on 4/1/2019 at 9:00 AM

## 2019-04-23 ENCOUNTER — TELEPHONE (OUTPATIENT)
Dept: FAMILY MEDICINE | Facility: OTHER | Age: 39
End: 2019-04-23

## 2019-04-23 ENCOUNTER — OFFICE VISIT (OUTPATIENT)
Dept: FAMILY MEDICINE | Facility: OTHER | Age: 39
End: 2019-04-23
Attending: NURSE PRACTITIONER
Payer: COMMERCIAL

## 2019-04-23 VITALS
SYSTOLIC BLOOD PRESSURE: 132 MMHG | DIASTOLIC BLOOD PRESSURE: 95 MMHG | TEMPERATURE: 97.9 F | OXYGEN SATURATION: 93 % | RESPIRATION RATE: 20 BRPM | BODY MASS INDEX: 33.33 KG/M2 | WEIGHT: 239 LBS | HEART RATE: 102 BPM

## 2019-04-23 DIAGNOSIS — J02.0 ACUTE STREPTOCOCCAL PHARYNGITIS: Primary | ICD-10-CM

## 2019-04-23 DIAGNOSIS — R07.0 THROAT PAIN: ICD-10-CM

## 2019-04-23 DIAGNOSIS — K13.70 LESION OF UVULA: ICD-10-CM

## 2019-04-23 DIAGNOSIS — H92.02 ACUTE EAR PAIN, LEFT: ICD-10-CM

## 2019-04-23 PROCEDURE — 99214 OFFICE O/P EST MOD 30 MIN: CPT | Performed by: NURSE PRACTITIONER

## 2019-04-23 RX ORDER — PENICILLIN V POTASSIUM 500 MG/1
500 TABLET, FILM COATED ORAL 2 TIMES DAILY
Qty: 20 TABLET | Refills: 0 | Status: SHIPPED | OUTPATIENT
Start: 2019-04-23 | End: 2019-05-03

## 2019-04-23 ASSESSMENT — PAIN SCALES - GENERAL: PAINLEVEL: MODERATE PAIN (4)

## 2019-04-23 NOTE — ADDENDUM NOTE
Encounter addended by: Jacquelin Gray, PT on: 4/23/2019 7:49 AM   Actions taken: Pend clinical note, Flowsheet accepted, Sign clinical note, Episode resolved

## 2019-04-23 NOTE — PROGRESS NOTES
HPI:    Julio Trent is a 38 year old male  who presents to clinic today for throat pain.    Sneezing, runny nose, stuffy nose, watery eyes, snoring, sore throat, left ear pain, fatigue, body aches, sinus pressure, and headaches for the past 2 weeks.  No cough.  No chest congestion.  Chest feels tight at times.  No chest heaviness.  No chest pain.  Mild shortness of breath with walking and exertion.  No wheezing.  Alternating feeling hot and cold but no measured fevers when checked.  No recent tick bites.      Current daily smoker about 0.5 ppd.      No flu shot.        Past Medical History:   Diagnosis Date     Anxiety disorder     2/3/2016     Endocrine disorder     2/3/2016     Major depressive disorder, single episode, moderate (H)     2/3/2016     Past Surgical History:   Procedure Laterality Date     ARTHROSCOPY KNEE      No Comments Provided     OTHER SURGICAL HISTORY      [id number],OTHER     OTHER SURGICAL HISTORY      2005,600000,OTHER,Bilateral,due to gynecomastia     Social History     Tobacco Use     Smoking status: Current Every Day Smoker     Types: Cigarettes     Smokeless tobacco: Former User   Substance Use Topics     Alcohol use: Yes     Alcohol/week: 0.0 oz     Comment: Alcoholic Drinks/day: socially     No current outpatient medications on file.     Allergies   Allergen Reactions     Iron Rash         Past medical history, past surgical history, current medications and allergies reviewed and accurate to the best of my knowledge.        ROS:  Refer to HPI    BP (!) 132/95 (BP Location: Left arm, Patient Position: Sitting, Cuff Size: Adult Large)   Pulse 102   Temp 97.9  F (36.6  C) (Tympanic)   Resp 20   Wt 108.4 kg (239 lb)   SpO2 93%   BMI 33.33 kg/m      EXAM:  General Appearance: Well appearing adult male, non toxic appearance, appropriate appearance for age. No acute distress  Head: normocephalic, atraumatic  Ears: Left TM grey, translucent with bony landmarks appreciated, no erythema,  serous effusion with bulging, no purulence.  Right TM grey, translucent with bony landmarks appreciated, no erythema, no effusion, no bulging, no purulence.  Left auditory canal clear.  Right auditory canal clear.  Normal external ears, non tender.  Eyes: conjunctivae normal without erythema or irritation, no drainage or crusting, no eyelid swelling, pupils equal   Orophayrnx: moist mucous membranes, no buccal lesions noted, posterior pharynx with beefy erythema, tonsils with 2+ hypertrophy, beefy erythema, no exudates or petechiae, no post nasal drip seen, no trismus, voice clear.  Uvula with beefy erythema, right side of uvula with yellow appearing lesion, no ulceration or bleeding.  Teeth intact, no gingival swelling or erythema.  Sinuses:  Mild sinus tenderness upon palpation of the frontal and maxillary sinuses  Nose:  no drainage or congestion   Neck: tenderness over the tonsillar and anterior cervical lymph area  Respiratory: normal chest wall and respirations.  Normal effort.  Clear to auscultation bilaterally, no wheezing, crackles or rhonchi.  No increased work of breathing.  No cough appreciated, oxygen saturation 93%  Cardiac: RRR with no murmurs  Musculoskeletal:  Normal gait.  Equal movement of bilateral upper extremities.  Equal movement of bilateral lower extremities.    Psychological: normal affect, alert and pleasant      Labs:  Strep test not clinically necessary.  Patient declines blood draw at this visit.      Xray:  Recommend CXR, patient declines/refuses at this visit.        ASSESSMENT/PLAN:  1. Throat pain    Exam consistent with strep.      2. Lesion of uvula    Discussed importance of close follow up.  IF lesion still present in 2 weeks patient will need to see ENT for further evaluation and possible biopsy.      3. Acute ear pain, left    Acute serous effusion present on exam - likely from acute allergies and congestion     4. Acute streptococcal pharyngitis    Clinical exam consistent  with strep, treat based on exam and center criteria.      - penicillin V (VEETID) 500 MG tablet; Take 1 tablet (500 mg) by mouth 2 times daily for 10 days  Dispense: 20 tablet; Refill: 0    Encouraged fluids  Symptomatic treatment - salt water gargles, honey, elevation, humidifier, sinus rinse/netti pot, lozenges, etc     OTC - Tylenol or ibuprofen PRN    OTC - allergy medication daily PRN    OTC - nasal spray daily PRN     Discussed warning signs/symptoms indicative of need to f/u    Follow up if symptoms persist or worsen or concerns

## 2019-04-23 NOTE — PROGRESS NOTES
Outpatient Physical Therapy Discharge Note     Patient: Julio Trent  : 1980    Beginning/End Dates of Reporting Period:  19 to 2019    Referring Provider: Jolie Angel CNP     Therapy Diagnosis:    Chronic neck and shoulder pain, muscle spasms, headaches, mechanical and myofascial back and neck pain            Client Self Report: Pt was last seen in PT on 3-18-19. He then failed his appointment on 3-20-19 and cancelled his appointment on 3-25-19, stating that his auto insurance was not paying for his PT. He has failed to reschedule since that time.     Subjective report at the time of his last session:  Notes that his left leg is bothering him. Went ice fishing this weekend, [place]. Notes that his left leg slipped on the ice quite often. Neck seems to be looser so whatever we are doing is helping.     Objective Measurements: Current status unknown; clinical findings at the time of his last visit as follows:  Objective Measure: postural loading  Details:  General listening: L upper cervical. Head L/R and R/L. Shoulders improved with most restriction R/R and R/L. Pelvis R/R and R/L  Objective Measure: segmental mobility  Details: Limited R to L and L to R lateral glide C0-1 but better. Limited C23 SB and rotation R, lateral glide R to L, C34, C656, C67 rotation R.    Objective Measure: myofascial  Details: Restriction in the L>R pharyngobasilar fascia, deep anterior cervical fascia, particularly L lower, middle R cervical fascia. L>R restriction in the LS with neural restriction dorsal scapular nerve L and trigger point at the insertion to the scapula L. Improved fascial mobility through the anterior thorax. Local anterior listening to the L pleural dome. Restriction in the      Goals: current status unknown  Goal Identifier HEP   Goal Description Pt to display independence and compliance with HEP to assist with self management   Target Date 19   Date Met       Progress:     Goal Identifier ROM   Goal Description Pt to display improved cervical ROM to at least 75% of normal and improved thoracic extension to allow improved ease of looking over his shoulder for driving, working overhead.   Target Date 06/21/19   Date Met      Progress:     Goal Identifier sleep   Goal Description Decrease pain to allow pt to sleep 4-6 hours without awakening due to pain   Target Date 08/21/19   Date Met      Progress:     Goal Identifier segmental mobility   Goal Description Pt to display improved, symmetrical thoracic, cervical and rib mobility to allow him to return to moderate yardwork with minimal to no increase in pain   Target Date 08/21/19   Date Met      Progress:     Goal Identifier myofascial   Goal Description Pt to display improved myofascial mobility to allow decreased headaches to mild intensity with frequency of weekly or less   Target Date 08/21/19   Date Met        Progress Toward Goals:   Unknown due to unplanned discharge.    Home program:  anterior cervical fascia self stretch, supine thoracic ext stretch with towel roll, LS self stretch, clavipectoral stretch door frame (unilateral), chin nods, posture, Green[place]ic (UE only)    Plan:  Discharge from therapy.    Discharge:    Reason for Discharge: Patient has failed to schedule further appointments.    Equipment Issued: none    Discharge Plan: Patient to continue home program.    Jacquelin Gray, PT on 4/23/2019 at 7:49 AM

## 2019-04-23 NOTE — TELEPHONE ENCOUNTER
Patient called in regards to getting an appointment today for a cold he has had for a few days and is not getting any better. He also states he has a groin injury he would like checked out. Please call him back in regards to this. Thank You!

## 2019-04-23 NOTE — NURSING NOTE
"Chief Complaint   Patient presents with     URI     throat pain      Groin Pain     possible bulge on the left        Initial BP (!) 132/95 (BP Location: Left arm, Patient Position: Sitting, Cuff Size: Adult Large)   Pulse 102   Temp 97.9  F (36.6  C) (Tympanic)   Resp 20   Wt 108.4 kg (239 lb)   SpO2 93%   BMI 33.33 kg/m   Estimated body mass index is 33.33 kg/m  as calculated from the following:    Height as of 2/19/19: 1.803 m (5' 11\").    Weight as of this encounter: 108.4 kg (239 lb).  Medication Reconciliation: complete    Jane Chance LPN  "

## 2019-05-20 ENCOUNTER — OFFICE VISIT (OUTPATIENT)
Dept: UROLOGY | Facility: OTHER | Age: 39
End: 2019-05-20
Attending: UROLOGY
Payer: COMMERCIAL

## 2019-05-20 VITALS
WEIGHT: 240.6 LBS | RESPIRATION RATE: 16 BRPM | DIASTOLIC BLOOD PRESSURE: 80 MMHG | HEART RATE: 86 BPM | BODY MASS INDEX: 33.56 KG/M2 | SYSTOLIC BLOOD PRESSURE: 130 MMHG

## 2019-05-20 DIAGNOSIS — E29.1 HYPOGONADISM MALE: Primary | ICD-10-CM

## 2019-05-20 PROCEDURE — 99213 OFFICE O/P EST LOW 20 MIN: CPT | Performed by: UROLOGY

## 2019-05-20 PROCEDURE — 96372 THER/PROPH/DIAG INJ SC/IM: CPT

## 2019-05-20 PROCEDURE — 25000128 H RX IP 250 OP 636: Performed by: UROLOGY

## 2019-05-20 RX ADMIN — TESTOSTERONE UNDECANOATE 750 MG: 250 INJECTION INTRAMUSCULAR at 09:08

## 2019-05-20 ASSESSMENT — PAIN SCALES - GENERAL: PAINLEVEL: NO PAIN (0)

## 2019-05-20 NOTE — NURSING NOTE
"Pt presents to clinic for his 7 week Aveed injection    Review of Systems:    Weight loss:    No     Recent fever/chills:  No   Night sweats:   No  Current skin rash:  No   Recent hair loss:  No  Heat intolerance:  No   Cold intolerance:  Yes  Chest pain:   No   Palpitations:   No  Shortness of breath:  No   Wheezing:   No  Constipation:    No   Diarrhea:   No   Nausea:   No   Vomiting:   No   Kidney/side pain:  No   Back pain:   No  Frequent headaches:  No   Dizziness:     No  Leg swelling:   No   Calf pain:    No    Patient given \"Aveed Treatment: A Patient Guide\" form.  Injection given and patient in clinic for 30 minutes after injection for monitoring.  Patient tolerated injection with no problems.  Grazyna Glasgow LPN .........5/20/2019...8:57 AM      "

## 2019-05-20 NOTE — PROGRESS NOTES
"Type of Visit  EST    Chief Complaint  Hypogonadism    HPI  Mr. Trent is a 38 y.o. male who follows up with hypogonadism.  Primary symptom of low energy started 4 years ago.  He previously failed Axiron and short acting injections led to significant swings and testosterone level.  He is currently undergoing replacement with Aveed every 7 weeks.  The frequency has been adjusted based on symptoms.  The patient continues to be extremely happy with the new regimen of injections every 7 weeks.  He is due for labs prior to the next injection.      Review of Systems  I personally reviewed the ROS with the patient.    Nursing Notes:   Grazyna Glasgow LPN  5/20/2019  9:12 AM  Signed  Pt presents to clinic for his 7 week Aveed injection    Review of Systems:    Weight loss:    No     Recent fever/chills:  No   Night sweats:   No  Current skin rash:  No   Recent hair loss:  No  Heat intolerance:  No   Cold intolerance:  Yes  Chest pain:   No   Palpitations:   No  Shortness of breath:  No   Wheezing:   No  Constipation:    No   Diarrhea:   No   Nausea:   No   Vomiting:   No   Kidney/side pain:  No   Back pain:   No  Frequent headaches:  No   Dizziness:     No  Leg swelling:   No   Calf pain:    No    Patient given \"Aveed Treatment: A Patient Guide\" form.  Injection given and patient in clinic for 30 minutes after injection for monitoring.  Patient tolerated injection with no problems.  Grazyna Glasgow LPN .........5/20/2019...8:57 AM      Physical Exam  /80 (BP Location: Left arm, Patient Position: Sitting, Cuff Size: Adult Regular)   Pulse 86   Resp 16   Wt 109.1 kg (240 lb 9.6 oz)   BMI 33.56 kg/m    Constitutional: No acute distress.  Alert and cooperative   Head: NCAT  Eyes: Conjunctivae normal  Cardiovascular: Regular rate.  Pulmonary/Chest: Respirations are even and non-labored bilaterally, no audible wheezing  Abdominal: Soft. No distension, tenderness, masses or guarding.   Neurological: A + O x 3.  " Cranial Nerves II-XII grossly intact.  Extremities: BEST x 4, Warm. No clubbing.  No cyanosis.    Skin: Pink, warm and dry.  No visible rashes noted.  Psychiatric:  Normal mood and affect  Back:  No left CVA tenderness.  No right CVA tenderness.  Genitourinary:  Nonpalpable bladder    Labs  Results for KENNETH TRENT (MRN [id number]) as of 12/21/2018 08:26   12/7/2018 08:45   Testosterone Total 316   Hematocrit 53.6 (H)     Results for KENNETH TRENT (MRN [id number]) as of 4/26/2018 08:15   4/12/2018 08:50   Hematocrit 50.5   Testosterone Total 233     Results for KENNETH TRENT (MRN [id number]) as of 11/9/2017 12:39   8/10/2017 08:17 11/9/2017 11:40   TESTOSTERONE,TOTAL 250.3 181.1     Results for KENNETH TRENT (MRN [id number]) as of 12/19/2016 08:35   2/3/2016 17:06 12/14/2016 16:27   TSH 1.27    VITAMIN D TOTAL AFL 21.2    TESTOSTERONE FREE 74.00 (H) 4.49 (L)   TESTOSTERONE,TOTAL 2000 (H) 136 (L)     Results for KENNETH TRENT (MRN [id number]) as of 10/26/2018 09:12   5/8/2018 17:10   Creatinine 1.18     Testosterone Injection  Today the patient received an injection of testosterone undecanoate 750mg.  This was given in the gluteus.  The results of this injection: None  Patient was monitored for 30 minutes following the injection.    Assessment  Mr. Trent is a 38 y.o. male with clinically symptomatic hypogonadism.  Labs in 5 weeks.    Plan  Labs in 5 weeks (T and hematocrit)  Aveed 750mg IM every 7 weeks (symptomatically going well)

## 2019-06-24 DIAGNOSIS — E29.1 HYPOGONADISM MALE: ICD-10-CM

## 2019-06-24 LAB
HCT VFR BLD AUTO: 52.7 % (ref 40–53)
TESTOST SERPL-MCNC: 309 NG/DL (ref 175–781)

## 2019-06-24 PROCEDURE — 85014 HEMATOCRIT: CPT | Performed by: UROLOGY

## 2019-06-24 PROCEDURE — 84403 ASSAY OF TOTAL TESTOSTERONE: CPT | Performed by: UROLOGY

## 2019-06-24 PROCEDURE — 36415 COLL VENOUS BLD VENIPUNCTURE: CPT | Performed by: UROLOGY

## 2019-07-08 DIAGNOSIS — E29.1 HYPOGONADISM MALE: Primary | ICD-10-CM

## 2019-07-12 ENCOUNTER — HOSPITAL ENCOUNTER (OUTPATIENT)
Dept: GENERAL RADIOLOGY | Facility: OTHER | Age: 39
End: 2019-07-12
Attending: CHIROPRACTOR
Payer: COMMERCIAL

## 2019-07-12 ENCOUNTER — HOSPITAL ENCOUNTER (OUTPATIENT)
Dept: GENERAL RADIOLOGY | Facility: OTHER | Age: 39
Discharge: HOME | End: 2019-07-12
Attending: CHIROPRACTOR | Admitting: CHIROPRACTOR
Payer: COMMERCIAL

## 2019-07-12 DIAGNOSIS — S13.100D SUBLUXATION OF CERVICAL VERTEBRAE, SUBSEQUENT ENCOUNTER: ICD-10-CM

## 2019-07-12 DIAGNOSIS — S23.100D SUBLUXATION OF THORACIC VERTEBRA, SUBSEQUENT ENCOUNTER: ICD-10-CM

## 2019-07-12 DIAGNOSIS — S33.100D SUBLUXATION OF LUMBAR VERTEBRA, SUBSEQUENT ENCOUNTER: ICD-10-CM

## 2019-07-12 PROCEDURE — 25000125 ZZHC RX 250: Performed by: RADIOLOGY

## 2019-07-12 PROCEDURE — 25500064 ZZH RX 255 OP 636: Performed by: RADIOLOGY

## 2019-07-12 PROCEDURE — 25000128 H RX IP 250 OP 636: Performed by: RADIOLOGY

## 2019-07-12 PROCEDURE — 62321 NJX INTERLAMINAR CRV/THRC: CPT

## 2019-07-12 PROCEDURE — G0463 HOSPITAL OUTPT CLINIC VISIT: HCPCS

## 2019-07-12 RX ORDER — LIDOCAINE HYDROCHLORIDE 10 MG/ML
10 INJECTION, SOLUTION INFILTRATION; PERINEURAL ONCE
Status: COMPLETED | OUTPATIENT
Start: 2019-07-12 | End: 2019-07-12

## 2019-07-12 RX ORDER — METHYLPREDNISOLONE ACETATE 80 MG/ML
80 INJECTION, SUSPENSION INTRA-ARTICULAR; INTRALESIONAL; INTRAMUSCULAR; SOFT TISSUE ONCE
Status: COMPLETED | OUTPATIENT
Start: 2019-07-12 | End: 2019-07-12

## 2019-07-12 RX ADMIN — LIDOCAINE HYDROCHLORIDE 2 ML: 10 INJECTION, SOLUTION INFILTRATION; PERINEURAL at 09:01

## 2019-07-12 RX ADMIN — IOHEXOL 2 ML: 240 INJECTION, SOLUTION INTRATHECAL; INTRAVASCULAR; INTRAVENOUS; ORAL at 09:00

## 2019-07-12 RX ADMIN — METHYLPREDNISOLONE ACETATE 80 MG: 80 INJECTION, SUSPENSION INTRA-ARTICULAR; INTRALESIONAL; INTRAMUSCULAR; SOFT TISSUE at 09:01

## 2019-10-06 NOTE — NURSING NOTE
Here for Aveed injection.  Review of Systems:    Weight loss:    No     Recent fever/chills:  No   Night sweats:   No  Current skin rash:  No   Recent hair loss:  No  Heat intolerance:  No   Cold intolerance:  No  Chest pain:   No   Palpitations:   No  Shortness of breath:  No   Wheezing:   No  Constipation:    No   Diarrhea:   No   Nausea:   No   Vomiting:   No   Kidney/side pain:  No   Back pain:   No  Frequent headaches:  No   Dizziness:     No  Leg swelling:   No   Calf pain:    No    Candie Barragan LPN on 3/1/2018 at 11:53 AM     

## 2020-02-05 NOTE — NURSING NOTE
Patient Information     Patient Name MRN Julio Guzman [id number] Male 1980      Nursing Note by Candie Barragan at 2017  8:00 AM     Author:  Candie Barragan Service:  (none) Author Type:  (none)     Filed:  2017  8:10 AM Encounter Date:  2017 Status:  Signed     :  Candie Barragan            Here for 3rd Aveed injection.  Review of Systems:    Weight loss:    No     Recent fever/chills:  Yes   Night sweats:   No  Current skin rash:  No   Recent hair loss:  No  Heat intolerance:  No   Cold intolerance:  Yes  Chest pain:   No   Palpitations:   No  Shortness of breath:  Yes   Wheezing:   Yes  Constipation:    No   Diarrhea:   No   Nausea:   No   Vomiting:   No   Kidney/side pain:  No   Back pain:   Yes  Frequent headaches:  Yes   Dizziness:     No  Leg swelling:   No   Calf pain:    No      Candie Barragan LPN  2017  8:05 AM                
97

## 2020-02-07 ENCOUNTER — OFFICE VISIT (OUTPATIENT)
Dept: FAMILY MEDICINE | Facility: OTHER | Age: 40
End: 2020-02-07
Attending: FAMILY MEDICINE
Payer: COMMERCIAL

## 2020-02-07 VITALS
DIASTOLIC BLOOD PRESSURE: 82 MMHG | HEART RATE: 100 BPM | SYSTOLIC BLOOD PRESSURE: 122 MMHG | OXYGEN SATURATION: 97 % | BODY MASS INDEX: 34.61 KG/M2 | TEMPERATURE: 98.6 F | RESPIRATION RATE: 18 BRPM | WEIGHT: 247.2 LBS | HEIGHT: 71 IN

## 2020-02-07 DIAGNOSIS — M50.30 DDD (DEGENERATIVE DISC DISEASE), CERVICAL: ICD-10-CM

## 2020-02-07 DIAGNOSIS — M62.838 CERVICAL PARASPINOUS MUSCLE SPASM: Primary | ICD-10-CM

## 2020-02-07 PROCEDURE — 99214 OFFICE O/P EST MOD 30 MIN: CPT | Performed by: FAMILY MEDICINE

## 2020-02-07 RX ORDER — NORTRIPTYLINE HCL 25 MG
CAPSULE ORAL
Qty: 180 CAPSULE | Refills: 1 | Status: SHIPPED | OUTPATIENT
Start: 2020-02-07

## 2020-02-07 ASSESSMENT — PAIN SCALES - GENERAL: PAINLEVEL: SEVERE PAIN (7)

## 2020-02-07 ASSESSMENT — MIFFLIN-ST. JEOR: SCORE: 2068.42
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
## 2020-02-11 ASSESSMENT — ENCOUNTER SYMPTOMS
CHEST TIGHTNESS: 0
ACTIVITY CHANGE: 0
PALPITATIONS: 0
MYALGIAS: 1
COUGH: 0
NECK STIFFNESS: 1
SHORTNESS OF BREATH: 0

## 2020-03-11 ENCOUNTER — HEALTH MAINTENANCE LETTER (OUTPATIENT)
Age: 40
End: 2020-03-11

## 2020-11-09 ENCOUNTER — NURSE TRIAGE (OUTPATIENT)
Dept: FAMILY MEDICINE | Facility: OTHER | Age: 40
End: 2020-11-09

## 2020-11-09 NOTE — TELEPHONE ENCOUNTER
"S-(situation): Wife call with concern of abdominal pain of patient    B-(background): Symptoms began over weekend.     A-(assessment): Wife speaking on behalf of patient, stating patient is unable to talk due to pain. Cramping pain 10/10 location of lower stomach, chest and back. Wife stating patient cannot walk with pain. Fever of 100.7, has not taken any OTC medications other than natural supplement called \"calm\" as of today. Diarrhea, nausea, and cough present. Patient vomited last evening from coughing, emesis in one occurrence this morning. Eating normally, increasing fluid intake. Denies urinary problems, blood in stool, headache, loss of consciousness. No recent COVID-19 testing, no known exposure.     R-(recommendations): Per triage protocol, recommendation that patient be seen in ED now for severe abdominal pain. Wife agrees with plan. Discussed with wife to call back regarding need for assistance in transport of patient, worsening of patient symptoms. Wife will bring patient into ED within next hour. No further questions at this time.         Reason for Disposition    SEVERE abdominal pain (e.g., excruciating)    Protocols used: ABDOMINAL PAIN - MALE-A-OH    Shi Acosta RN ....................  11/9/2020   2:42 PM      "

## 2020-12-27 ENCOUNTER — HEALTH MAINTENANCE LETTER (OUTPATIENT)
Age: 40
End: 2020-12-27

## 2021-04-25 ENCOUNTER — HEALTH MAINTENANCE LETTER (OUTPATIENT)
Age: 41
End: 2021-04-25

## 2021-05-13 ENCOUNTER — TRANSFERRED RECORDS (OUTPATIENT)
Dept: HEALTH INFORMATION MANAGEMENT | Facility: OTHER | Age: 41
End: 2021-05-13

## 2021-05-13 LAB — PHQ9 SCORE: 8

## 2021-05-18 ENCOUNTER — MEDICAL CORRESPONDENCE (OUTPATIENT)
Dept: HEALTH INFORMATION MANAGEMENT | Facility: OTHER | Age: 41
End: 2021-05-18

## 2021-05-19 ENCOUNTER — TRANSFERRED RECORDS (OUTPATIENT)
Dept: HEALTH INFORMATION MANAGEMENT | Facility: OTHER | Age: 41
End: 2021-05-19

## 2021-10-09 ENCOUNTER — HEALTH MAINTENANCE LETTER (OUTPATIENT)
Age: 41
End: 2021-10-09

## 2022-04-13 ENCOUNTER — HOSPITAL ENCOUNTER (EMERGENCY)
Facility: OTHER | Age: 42
Discharge: HOME | End: 2022-04-13
Attending: STUDENT IN AN ORGANIZED HEALTH CARE EDUCATION/TRAINING PROGRAM | Admitting: STUDENT IN AN ORGANIZED HEALTH CARE EDUCATION/TRAINING PROGRAM
Payer: COMMERCIAL

## 2022-04-13 VITALS
SYSTOLIC BLOOD PRESSURE: 137 MMHG | WEIGHT: 247 LBS | RESPIRATION RATE: 18 BRPM | TEMPERATURE: 100.1 F | BODY MASS INDEX: 34.45 KG/M2 | OXYGEN SATURATION: 95 % | DIASTOLIC BLOOD PRESSURE: 68 MMHG | HEART RATE: 101 BPM

## 2022-04-13 DIAGNOSIS — J10.1 INFLUENZA A: ICD-10-CM

## 2022-04-13 LAB
ATRIAL RATE - MUSE: 110 BPM
DIASTOLIC BLOOD PRESSURE - MUSE: NORMAL MMHG
FLUAV RNA SPEC QL NAA+PROBE: POSITIVE
FLUBV RNA RESP QL NAA+PROBE: NEGATIVE
INTERPRETATION ECG - MUSE: NORMAL
P AXIS - MUSE: 46 DEGREES
PR INTERVAL - MUSE: 148 MS
QRS DURATION - MUSE: 90 MS
QT - MUSE: 326 MS
QTC - MUSE: 441 MS
R AXIS - MUSE: 56 DEGREES
RSV RNA SPEC NAA+PROBE: NEGATIVE
SARS-COV-2 RNA RESP QL NAA+PROBE: NEGATIVE
SYSTOLIC BLOOD PRESSURE - MUSE: NORMAL MMHG
T AXIS - MUSE: 33 DEGREES
VENTRICULAR RATE- MUSE: 110 BPM

## 2022-04-13 PROCEDURE — 93005 ELECTROCARDIOGRAM TRACING: CPT | Performed by: STUDENT IN AN ORGANIZED HEALTH CARE EDUCATION/TRAINING PROGRAM

## 2022-04-13 PROCEDURE — 96372 THER/PROPH/DIAG INJ SC/IM: CPT | Performed by: STUDENT IN AN ORGANIZED HEALTH CARE EDUCATION/TRAINING PROGRAM

## 2022-04-13 PROCEDURE — C9803 HOPD COVID-19 SPEC COLLECT: HCPCS | Performed by: STUDENT IN AN ORGANIZED HEALTH CARE EDUCATION/TRAINING PROGRAM

## 2022-04-13 PROCEDURE — 99284 EMERGENCY DEPT VISIT MOD MDM: CPT | Mod: 25 | Performed by: STUDENT IN AN ORGANIZED HEALTH CARE EDUCATION/TRAINING PROGRAM

## 2022-04-13 PROCEDURE — 250N000013 HC RX MED GY IP 250 OP 250 PS 637: Performed by: STUDENT IN AN ORGANIZED HEALTH CARE EDUCATION/TRAINING PROGRAM

## 2022-04-13 PROCEDURE — 93010 ELECTROCARDIOGRAM REPORT: CPT | Performed by: INTERNAL MEDICINE

## 2022-04-13 PROCEDURE — 96360 HYDRATION IV INFUSION INIT: CPT | Performed by: STUDENT IN AN ORGANIZED HEALTH CARE EDUCATION/TRAINING PROGRAM

## 2022-04-13 PROCEDURE — 87637 SARSCOV2&INF A&B&RSV AMP PRB: CPT | Performed by: STUDENT IN AN ORGANIZED HEALTH CARE EDUCATION/TRAINING PROGRAM

## 2022-04-13 PROCEDURE — 258N000003 HC RX IP 258 OP 636: Performed by: STUDENT IN AN ORGANIZED HEALTH CARE EDUCATION/TRAINING PROGRAM

## 2022-04-13 PROCEDURE — 250N000011 HC RX IP 250 OP 636: Performed by: STUDENT IN AN ORGANIZED HEALTH CARE EDUCATION/TRAINING PROGRAM

## 2022-04-13 PROCEDURE — 96361 HYDRATE IV INFUSION ADD-ON: CPT | Performed by: STUDENT IN AN ORGANIZED HEALTH CARE EDUCATION/TRAINING PROGRAM

## 2022-04-13 PROCEDURE — 99283 EMERGENCY DEPT VISIT LOW MDM: CPT | Performed by: STUDENT IN AN ORGANIZED HEALTH CARE EDUCATION/TRAINING PROGRAM

## 2022-04-13 RX ORDER — OSELTAMIVIR PHOSPHATE 75 MG/1
75 CAPSULE ORAL 2 TIMES DAILY
Qty: 10 CAPSULE | Refills: 0 | Status: SHIPPED | OUTPATIENT
Start: 2022-04-13 | End: 2022-04-18

## 2022-04-13 RX ORDER — KETOROLAC TROMETHAMINE 30 MG/ML
30 INJECTION, SOLUTION INTRAMUSCULAR; INTRAVENOUS ONCE
Status: COMPLETED | OUTPATIENT
Start: 2022-04-13 | End: 2022-04-13

## 2022-04-13 RX ORDER — ONDANSETRON 4 MG/1
4 TABLET, ORALLY DISINTEGRATING ORAL ONCE
Status: DISCONTINUED | OUTPATIENT
Start: 2022-04-13 | End: 2022-04-13 | Stop reason: HOSPADM

## 2022-04-13 RX ORDER — CYCLOBENZAPRINE HCL 10 MG
10 TABLET ORAL ONCE
Status: COMPLETED | OUTPATIENT
Start: 2022-04-13 | End: 2022-04-13

## 2022-04-13 RX ORDER — ACETAMINOPHEN 500 MG
1000 TABLET ORAL ONCE
Status: COMPLETED | OUTPATIENT
Start: 2022-04-13 | End: 2022-04-13

## 2022-04-13 RX ORDER — ALBUTEROL SULFATE 90 UG/1
2 AEROSOL, METERED RESPIRATORY (INHALATION) EVERY 6 HOURS PRN
Qty: 6.7 G | Refills: 0 | Status: SHIPPED | OUTPATIENT
Start: 2022-04-13

## 2022-04-13 RX ORDER — SODIUM CHLORIDE 9 MG/ML
1000 INJECTION, SOLUTION INTRAVENOUS CONTINUOUS
Status: DISCONTINUED | OUTPATIENT
Start: 2022-04-13 | End: 2022-04-13 | Stop reason: HOSPADM

## 2022-04-13 RX ADMIN — ACETAMINOPHEN 1000 MG: 500 TABLET, FILM COATED ORAL at 09:25

## 2022-04-13 RX ADMIN — SODIUM CHLORIDE 1000 ML: 9 INJECTION, SOLUTION INTRAVENOUS at 09:15

## 2022-04-13 RX ADMIN — SODIUM CHLORIDE 1000 ML: 9 INJECTION, SOLUTION INTRAVENOUS at 10:34

## 2022-04-13 RX ADMIN — CYCLOBENZAPRINE 10 MG: 10 TABLET, FILM COATED ORAL at 10:34

## 2022-04-13 RX ADMIN — KETOROLAC TROMETHAMINE 30 MG: 30 INJECTION, SOLUTION INTRAMUSCULAR at 10:34

## 2022-04-13 NOTE — ED TRIAGE NOTES
ED Nursing Triage Note (General)   ________________________________    Julio Trent is a 41 year old Male that presents to triage via private vehicle with complaints of flu like symptoms.  Patient states his daughter tested positive recently with influenza A.  Patient states he was been experiencing a fever, abdominal cramping, nausea, however, no emesis at this time.  Patient states he was not been able to tolerate fluids due to severe nausea. Patient states he recently had a zio patch applied and removed due to episodes of syncope.  Patient states no prior cardiac hx, however, states prior to arrival pain was across his chest.   Significant symptoms had onset 2 days ago.  Patient appears alert behavior.  GCS-15  Airway: intact  Breathing noted as Normal  Action taken: 3      PRE HOSPITAL PRIOR LIVING SITUATION-home

## 2022-04-13 NOTE — DISCHARGE INSTRUCTIONS
Complete Tamiflu antiviral to help with your influenza infection.  Use albuterol inhaler for any shortness of breath or wheezing.  This can also sometimes help with the cough.  Recommend smoking cessation.  Recommend PCP follow-up for further evaluation of your chronic pain throughout your chest and abdomen and in your back.  Your recent abdominal CT scan in November was reassuring.  Influenza can cause general inflammation of the body is likely worsening what of her pre-existing condition you had.    Please review attached instructions including reasons to return to the emergency department.

## 2022-04-13 NOTE — ED PROVIDER NOTES
History     Chief Complaint   Patient presents with     Flu Symptoms       Julio Trent is a 41 year old male who presents with viral type symptoms.  Over the past 36 hours patient has developed fever, sore throat, headache.  He has chronic intermittent sharp chest and diffuse abdomen pains which radiate to his his back and these have become even more severe over the past 36 hours having approximately 10 episodes overnight associated with severe intolerable pain.  He feels this is typical to his past pain but just slightly worse.  He has been taking Tylenol and ibuprofen without significant improvement.  He also reports some chronic mild bouts of blood in the stool with recent reassuring EGD and colonoscopy.  He is also scheduled to see an ENT doctor for what sounds to be a chronic cough.  He has had episodes of syncope for which she is worn a Zio patch recently.  Denies any vomiting or diarrhea currently, but has had significant nausea preventing p.o. intake.  His daughter recently tested positive for influenza A. He reports some periodic wheezing and is current smoker trying to quit.    Allergies   Allergen Reactions     Iron Rash       Patient Active Problem List    Diagnosis Date Noted     Hypogonadism male 12/19/2016     Priority: Medium     Bilateral carpal tunnel syndrome 12/14/2016     Priority: Medium     Decreased sex drive 12/14/2016     Priority: Medium     Elevated random blood glucose level 12/14/2016     Priority: Medium     Malaise and fatigue 12/14/2016     Priority: Medium     Mixed hyperlipidemia 12/14/2016     Priority: Medium     Myalgia 12/14/2016     Priority: Medium     Prediabetes 12/14/2016     Priority: Medium     Sleep difficulties 12/14/2016     Priority: Medium     Vitamin D deficiency 12/14/2016     Priority: Medium     Moderate anxiety 02/03/2016     Priority: Medium     Moderate major depression (H) 02/03/2016     Priority: Medium       Past Medical History:   Diagnosis Date      Anxiety disorder      Endocrine disorder      Major depressive disorder, single episode, moderate (H)        Past Surgical History:   Procedure Laterality Date     ARTHROSCOPY KNEE      No Comments Provided     OTHER SURGICAL HISTORY      [id number],OTHER     OTHER SURGICAL HISTORY      [id number],OTHER,Bilateral,due to gynecomastia       Family History   Problem Relation Age of Onset     Diabetes Father         Diabetes     Hypertension Father         Hypertension     Other - See Comments Mother         No Known Problems     Skin Cancer Sister         Skin cancer     Anxiety Disorder Sister         Anxiety disorder       Social History     Tobacco Use     Smoking status: Current Every Day Smoker     Types: Cigarettes     Smokeless tobacco: Former User   Substance Use Topics     Alcohol use: Yes     Alcohol/week: 0.0 standard drinks     Comment: Alcoholic Drinks/day: socially     Drug use: No     Types: Marijuana, Other     Comment: Drug use: YesHx of Cocaine, Methampetamine use - about age [age]       Medications:    albuterol (PROAIR HFA/PROVENTIL HFA/VENTOLIN HFA) 108 (90 Base) MCG/ACT inhaler  oseltamivir (TAMIFLU) 75 MG capsule  nortriptyline (PAMELOR) 25 MG capsule        Review of Systems: See HPI for pertinent negatives and positives. All other systems reviewed and found to be negative.    Physical Exam   /68   Pulse 101   Temp 100.1  F (37.8  C) (Tympanic)   Resp 18   Wt 112 kg (247 lb)   SpO2 95%   BMI 34.45 kg/m       General: awake, uncomfortable  HEENT: atraumatic  Respiratory: normal effort, clear to auscultation bilaterally  Cardiovascular: tachycardic, regular rhythm, no murmurs  Abdomen: soft, nondistended, nontender  Extremities: no deformities, edema, or tenderness  Skin: Increased warmth, clammy, no rashes  Neuro: alert, no focal deficits  Psych: appropriate mood and affect    ED Course      ED Course as of 04/13/22 1257   Wed Apr 13, 2022   0923 EKG ordered by RN with patient  complaining of upper chest pain. EKG: sinus tach 110, nonischemic with no ST abnormalities, LBBB, I/II/V3-6 TWI.         Results for orders placed or performed during the hospital encounter of 04/13/22 (from the past 24 hour(s))   Symptomatic; Unknown Influenza A/B & SARS-CoV2 (COVID-19) Virus PCR Multiplex Nose    Specimen: Nose; Swab   Result Value Ref Range    Influenza A PCR Positive (A) Negative    Influenza B PCR Negative Negative    RSV PCR Negative Negative    SARS CoV2 PCR Negative Negative    Narrative    Testing was performed using the Xpert Xpress CoV2/Flu/RSV Assay on the Cepheid GeneXpert Instrument. This test should be ordered for the detection of SARS-CoV-2 and influenza viruses in individuals who meet clinical and/or epidemiological criteria. Test performance is unknown in asymptomatic patients. This test is for in vitro diagnostic use under the FDA EUA for laboratories certified under CLIA to perform high or moderate complexity testing. This test has not been FDA cleared or approved. A negative result does not rule out the presence of PCR inhibitors in the specimen or target RNA in concentration below the limit of detection for the assay. If only one viral target is positive but coinfection with multiple targets is suspected, the sample should be re-tested with another FDA cleared, approved, or authorized test, if coinfection would change clinical management. This test was validated by the [facility] [employer]. These laboratories are certified under the Clinical  Laboratory Improvement Amendments of 1988 (CLIA-88) as qualified to perform high complexity laboratory testing.       Medications   ondansetron (ZOFRAN-ODT) ODT tab 4 mg (4 mg Oral Not Given 4/13/22 1034)   sodium chloride 0.9% infusion (has no administration in time range)   0.9% sodium chloride BOLUS (0 mLs Intravenous Stopped 4/13/22 1147)   acetaminophen (TYLENOL) tablet 1,000 mg (1,000 mg Oral Given 4/13/22 0906)   0.9%  sodium chloride BOLUS (0 mLs Intravenous Stopped 4/13/22 1147)   ketorolac (TORADOL) injection 30 mg (30 mg Intramuscular Given 4/13/22 1034)   cyclobenzaprine (FLEXERIL) tablet 10 mg (10 mg Oral Given 4/13/22 1034)       Assessments & Plan (with Medical Decision Making)     I have reviewed the nursing notes.    41 year old male evaluated for flulike symptoms including fever, headache, sore throat with worsening chronic intermittent chest and diffuse abdominal pain.  Febrile and uncomfortable appearing.  Influenza A positive.  Patient given fluids with Tylenol and Toradol and Flexeril with significant improvement.  He did report some past nausea and therefore was also given Zofran.  He declines any nausea prior to discharge and declines Zofran prescription.  Since patient is less than 2 days into his illness, Tamiflu is being prescribed.  With history of smoking and wheezing and painful cough, and albuterol inhaler is also being prescribed.  Patient does have a chronic cough with apparent plans for potential ENT evaluation.  He is concerned about his severe abdominal pain that comes and sharp intermittent bursts.  This pain can radiate up to his chest into his back.  Reviewed his November 2021 abdominal CT scan which did not demonstrate any concerning findings.  Suspect his worsening pains since the start of his influenza infection are magnified chronic pains due to general influenza inflammatory effect.  EKG nonischemic.  We opted to defer any abdominal CT imaging due to the reassuring CT scan recently. Attached instructions on diagnosis including ED return precautions given. Patient verbalizes understanding of plan and is comfortable with discharge home. Discharged home in stable condition.    I have reviewed the findings, diagnosis, plan, and need for any follow up with the patient.    Discharge Medication List as of 4/13/2022 11:47 AM      START taking these medications    Details   albuterol (PROAIR  HFA/PROVENTIL HFA/VENTOLIN HFA) 108 (90 Base) MCG/ACT inhaler Inhale 2 puffs into the lungs every 6 hours as needed for shortness of breath / dyspnea or wheezing, Disp-6.7 g, R-0, E-Prescribe      oseltamivir (TAMIFLU) 75 MG capsule Take 1 capsule (75 mg) by mouth 2 times daily for 5 days, Disp-10 capsule, R-0, E-Prescribe             Final diagnoses:   Influenza A       4/13/2022   [facility] alexander MD  04/13/22 1257

## 2022-05-21 ENCOUNTER — HEALTH MAINTENANCE LETTER (OUTPATIENT)
Age: 42
End: 2022-05-21

## 2022-09-17 ENCOUNTER — HEALTH MAINTENANCE LETTER (OUTPATIENT)
Age: 42
End: 2022-09-17

## 2023-06-04 ENCOUNTER — HEALTH MAINTENANCE LETTER (OUTPATIENT)
Age: 43
End: 2023-06-04

## 2024-07-13 ENCOUNTER — HEALTH MAINTENANCE LETTER (OUTPATIENT)
Age: 44
End: 2024-07-13

## 2025-04-23 ENCOUNTER — OFFICE VISIT (OUTPATIENT)
Dept: FAMILY MEDICINE | Facility: OTHER | Age: 45
End: 2025-04-23
Attending: PHYSICIAN ASSISTANT

## 2025-04-23 VITALS
BODY MASS INDEX: 31.91 KG/M2 | WEIGHT: 228.8 LBS | SYSTOLIC BLOOD PRESSURE: 126 MMHG | RESPIRATION RATE: 16 BRPM | TEMPERATURE: 98.3 F | OXYGEN SATURATION: 96 % | DIASTOLIC BLOOD PRESSURE: 80 MMHG | HEART RATE: 88 BPM

## 2025-04-23 DIAGNOSIS — R79.89 ELEVATED FERRITIN: ICD-10-CM

## 2025-04-23 DIAGNOSIS — E78.2 MIXED HYPERLIPIDEMIA: ICD-10-CM

## 2025-04-23 DIAGNOSIS — H91.91 DECREASED HEARING, RIGHT: ICD-10-CM

## 2025-04-23 DIAGNOSIS — Z13.1 SCREENING FOR DIABETES MELLITUS: ICD-10-CM

## 2025-04-23 DIAGNOSIS — J01.90 ACUTE SINUSITIS WITH SYMPTOMS > 10 DAYS: ICD-10-CM

## 2025-04-23 DIAGNOSIS — R74.8 ELEVATED LIVER ENZYMES: ICD-10-CM

## 2025-04-23 DIAGNOSIS — E11.9 TYPE 2 DIABETES MELLITUS WITHOUT COMPLICATION, WITHOUT LONG-TERM CURRENT USE OF INSULIN (H): ICD-10-CM

## 2025-04-23 DIAGNOSIS — Z13.220 LIPID SCREENING: ICD-10-CM

## 2025-04-23 DIAGNOSIS — R53.83 FATIGUE, UNSPECIFIED TYPE: Primary | ICD-10-CM

## 2025-04-23 LAB
ALBUMIN SERPL BCG-MCNC: 4.5 G/DL (ref 3.5–5.2)
ALP SERPL-CCNC: 68 U/L (ref 40–150)
ALT SERPL W P-5'-P-CCNC: 84 U/L (ref 0–70)
ANION GAP SERPL CALCULATED.3IONS-SCNC: 14 MMOL/L (ref 7–15)
AST SERPL W P-5'-P-CCNC: 46 U/L (ref 0–45)
BASOPHILS # BLD AUTO: 0.1 10E3/UL (ref 0–0.2)
BASOPHILS NFR BLD AUTO: 1 %
BILIRUB SERPL-MCNC: 0.5 MG/DL
BUN SERPL-MCNC: 13.9 MG/DL (ref 6–20)
CALCIUM SERPL-MCNC: 9.9 MG/DL (ref 8.8–10.4)
CHLORIDE SERPL-SCNC: 99 MMOL/L (ref 98–107)
CHOLEST SERPL-MCNC: 359 MG/DL
CREAT SERPL-MCNC: 0.97 MG/DL (ref 0.67–1.17)
EGFRCR SERPLBLD CKD-EPI 2021: >90 ML/MIN/1.73M2
EOSINOPHIL # BLD AUTO: 0.4 10E3/UL (ref 0–0.7)
EOSINOPHIL NFR BLD AUTO: 5 %
ERYTHROCYTE [DISTWIDTH] IN BLOOD BY AUTOMATED COUNT: 13.5 % (ref 10–15)
EST. AVERAGE GLUCOSE BLD GHB EST-MCNC: 266 MG/DL
FASTING STATUS PATIENT QL REPORTED: ABNORMAL
FASTING STATUS PATIENT QL REPORTED: ABNORMAL
FERRITIN SERPL-MCNC: 608 NG/ML (ref 31–409)
GLUCOSE SERPL-MCNC: 257 MG/DL (ref 70–99)
HBA1C MFR BLD: 10.9 %
HCO3 SERPL-SCNC: 22 MMOL/L (ref 22–29)
HCT VFR BLD AUTO: 46.5 % (ref 40–53)
HDLC SERPL-MCNC: 29 MG/DL
HGB BLD-MCNC: 16.6 G/DL (ref 13.3–17.7)
IMM GRANULOCYTES # BLD: 0.1 10E3/UL
IMM GRANULOCYTES NFR BLD: 1 %
IRON BINDING CAPACITY (ROCHE): 326 UG/DL (ref 240–430)
IRON SATN MFR SERPL: 33 % (ref 15–46)
IRON SERPL-MCNC: 108 UG/DL (ref 61–157)
LDLC SERPL CALC-MCNC: ABNORMAL MG/DL
LYMPHOCYTES # BLD AUTO: 3.3 10E3/UL (ref 0.8–5.3)
LYMPHOCYTES NFR BLD AUTO: 42 %
MCH RBC QN AUTO: 31.3 PG (ref 26.5–33)
MCHC RBC AUTO-ENTMCNC: 35.7 G/DL (ref 31.5–36.5)
MCV RBC AUTO: 88 FL (ref 78–100)
MONOCYTES # BLD AUTO: 0.5 10E3/UL (ref 0–1.3)
MONOCYTES NFR BLD AUTO: 7 %
NEUTROPHILS # BLD AUTO: 3.4 10E3/UL (ref 1.6–8.3)
NEUTROPHILS NFR BLD AUTO: 44 %
NONHDLC SERPL-MCNC: 330 MG/DL
NRBC # BLD AUTO: 0 10E3/UL
NRBC BLD AUTO-RTO: 0 /100
PLATELET # BLD AUTO: 274 10E3/UL (ref 150–450)
POTASSIUM SERPL-SCNC: 4.5 MMOL/L (ref 3.4–5.3)
PROT SERPL-MCNC: 8.1 G/DL (ref 6.4–8.3)
RBC # BLD AUTO: 5.31 10E6/UL (ref 4.4–5.9)
SODIUM SERPL-SCNC: 135 MMOL/L (ref 135–145)
TRIGL SERPL-MCNC: 1609 MG/DL
TSH SERPL DL<=0.005 MIU/L-ACNC: 1.08 UIU/ML (ref 0.3–4.2)
VIT B12 SERPL-MCNC: 978 PG/ML (ref 232–1245)
WBC # BLD AUTO: 7.7 10E3/UL (ref 4–11)

## 2025-04-23 PROCEDURE — 82310 ASSAY OF CALCIUM: CPT | Mod: ZL | Performed by: PHYSICIAN ASSISTANT

## 2025-04-23 PROCEDURE — 82465 ASSAY BLD/SERUM CHOLESTEROL: CPT | Mod: ZL | Performed by: PHYSICIAN ASSISTANT

## 2025-04-23 PROCEDURE — 84443 ASSAY THYROID STIM HORMONE: CPT | Mod: ZL | Performed by: PHYSICIAN ASSISTANT

## 2025-04-23 PROCEDURE — 82728 ASSAY OF FERRITIN: CPT | Mod: ZL | Performed by: PHYSICIAN ASSISTANT

## 2025-04-23 PROCEDURE — 82306 VITAMIN D 25 HYDROXY: CPT | Mod: ZL | Performed by: PHYSICIAN ASSISTANT

## 2025-04-23 PROCEDURE — 85004 AUTOMATED DIFF WBC COUNT: CPT | Mod: ZL | Performed by: PHYSICIAN ASSISTANT

## 2025-04-23 PROCEDURE — 85041 AUTOMATED RBC COUNT: CPT | Mod: ZL | Performed by: PHYSICIAN ASSISTANT

## 2025-04-23 PROCEDURE — 36415 COLL VENOUS BLD VENIPUNCTURE: CPT | Mod: ZL | Performed by: PHYSICIAN ASSISTANT

## 2025-04-23 PROCEDURE — 83036 HEMOGLOBIN GLYCOSYLATED A1C: CPT | Mod: ZL | Performed by: PHYSICIAN ASSISTANT

## 2025-04-23 PROCEDURE — 82947 ASSAY GLUCOSE BLOOD QUANT: CPT | Mod: ZL | Performed by: PHYSICIAN ASSISTANT

## 2025-04-23 PROCEDURE — 83540 ASSAY OF IRON: CPT | Mod: ZL | Performed by: PHYSICIAN ASSISTANT

## 2025-04-23 PROCEDURE — 84270 ASSAY OF SEX HORMONE GLOBUL: CPT | Mod: ZL | Performed by: PHYSICIAN ASSISTANT

## 2025-04-23 PROCEDURE — 83550 IRON BINDING TEST: CPT | Mod: ZL | Performed by: PHYSICIAN ASSISTANT

## 2025-04-23 PROCEDURE — 82607 VITAMIN B-12: CPT | Mod: ZL | Performed by: PHYSICIAN ASSISTANT

## 2025-04-23 RX ORDER — DOXYCYCLINE HYCLATE 100 MG
100 TABLET ORAL 2 TIMES DAILY
Qty: 20 TABLET | Refills: 0 | Status: SHIPPED | OUTPATIENT
Start: 2025-04-23

## 2025-04-23 ASSESSMENT — PAIN SCALES - GENERAL: PAINLEVEL_OUTOF10: MILD PAIN (3)
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
## 2025-04-23 NOTE — NURSING NOTE
"Chief Complaint   Patient presents with    Sinus Problem       Initial /80   Pulse 88   Temp 98.3  F (36.8  C) (Tympanic)   Resp 16   Wt 103.8 kg (228 lb 12.8 oz)   SpO2 96%   BMI 31.91 kg/m   Estimated body mass index is 31.91 kg/m  as calculated from the following:    Height as of 2/7/20: 1.803 m (5' 11\").    Weight as of this encounter: 103.8 kg (228 lb 12.8 oz).  Medication Review: complete    The next two questions are to help us understand your food security.  If you are feeling you need any assistance in this area, we have resources available to support you today.           No data to display                  Health Care Directive:  Patient does not have a Health Care Directive: Discussed advance care planning with patient; however, patient declined at this time.    Rhea Pulido LPN      "

## 2025-04-24 LAB
SHBG SERPL-SCNC: 12 NMOL/L (ref 11–80)
VIT D+METAB SERPL-MCNC: 26 NG/ML (ref 20–50)

## 2025-04-25 NOTE — PROGRESS NOTES
Assessment & Plan     Type 2 diabetes mellitus without complication, without long-term current use of insulin (H)  A1c elevated 10.9 last week, new diagnosis.  Discussed options and due to significantly elevated A1c recommend starting with a GLP-1 injectable medication once weekly.  Ozempic prescribed as outlined below starting with 0.25 mg once weekly x 4 weeks then increase to 0.5 mg once weekly x 4 weeks.  Educated medication, use, side effects.  Discussed significant lifestyle changes as well.  Patient will follow-up in 1 month for recheck or sooner if needed.  Will need to have updated eye exam, foot exam, 3 months diabetic checks.  Also discussed starting low-dose ACE or ARB for renal protection as well as baby aspirin.  - atorvastatin (LIPITOR) 20 MG tablet; Start with 1 tablet daily by mouth for 2-4 weeks then increase to 2 tablets daily if tolerating  - semaglutide (OZEMPIC) 2 MG/3ML pen; Inject 0.25 mg subcutaneously every 7 days.    Mixed hyperlipidemia  Recently noted to be significantly elevated.  Will start atorvastatin at a lower dose as below with likely increasing dose as tolerated.  Also discussed focusing on healthy lifestyle including improving diet and increasing physical activity as tolerated.  - atorvastatin (LIPITOR) 20 MG tablet; Start with 1 tablet daily by mouth for 2-4 weeks then increase to 2 tablets daily if tolerating    Chronic neck pain  Chronic nonintractable headache, unspecified headache type  Chronic difficulties with neck pain because tension headaches and secondary migrainous type symptoms.  Patient had taken rizatriptan in the past and noted significant improvement.  Has had extensive imaging as well as conservative treatments including muscle relaxers, other prescription medications, over-the-counter medications, cortisone injections for neck without relief.  Patient would like to try rizatriptan again for symptomatic management and if minimal improvement he will follow-up  for further evaluation.  - rizatriptan (MAXALT) 5 MG tablet; Take 1 tablet (5 mg) by mouth at onset of headache for migraine. May repeat in 2 hours. Max 6 tablets/24 hours. Max 20 per month    Elevated liver enzymes  Noted on labs last week.  Will be focusing on lifestyle and we will recheck at next appointment.    Elevated ferritin  Noted on labs last week.  Iron and iron binding panel stable.  Will recheck at next appointment.      Laxmi Kim is a 44 year old, presenting for the following health issues:  Treatment Plan (Diabetes and recent lab results ), Establish Care, and Neck Pain    History of Present Illness       Reason for visit:  Diabetic info and jaw  Symptom onset:  More than a month  Symptoms include:  Swollen jaw  Symptom intensity:  Moderate  Symptom progression:  Staying the same  Had these symptoms before:  No  What makes it worse:  No  What makes it better:  No   He is taking medications regularly.      Here for follow-up from recent appointment.  Patient had been seen in clinic for fatigue and screening labs on 4/23/2025.  Lab work returned showing extensive abnormalities with A1c elevated at 10.9, significant mixed hyperlipidemia showing a total cholesterol 359, triglycerides at 1609, unable to calculate LDL, elevated AST at 46 and ALT at 84, ferritin at 608.  Remainder of labs were stable.  Patient is here to discuss treatment options.  He has a goal to be significantly improving his diet and increasing physical activity as tolerated.  Patient also reports an extensive history of chronic neck pain.  Reports he has an extensive cervical spine history with degenerative change and disc bulges.  Had previously tried multiple prescription medications, muscle relaxers, chiropractic work, physical therapy, epidural injections without significant relief.  Patient reports he experiences secondary tension headaches and migrainous type symptoms.  Has done well with rizatriptan in the past.   Requesting prescription for rizatriptan for as needed management.      PAST MEDICAL HISTORY:   Past Medical History:   Diagnosis Date    Anxiety disorder     2/3/2016    Endocrine disorder     2/3/2016    Major depressive disorder, single episode, moderate (H)     2/3/2016       PAST SURGICAL HISTORY:   Past Surgical History:   Procedure Laterality Date    ARTHROSCOPY KNEE      No Comments Provided    OTHER SURGICAL HISTORY      [id number],OTHER    OTHER SURGICAL HISTORY      ,[id number],OTHER,Bilateral,due to gynecomastia       FAMILY HISTORY:   Family History   Problem Relation Age of Onset    Diabetes Father         Diabetes    Hypertension Father         Hypertension    Other - See Comments Mother         No Known Problems    Skin Cancer Sister         Skin cancer    Anxiety Disorder Sister         Anxiety disorder       SOCIAL HISTORY:   Social History     Tobacco Use    Smoking status: Former     Current packs/day: 0.00     Types: Cigarettes     Quit date: 2024     Years since quittin.0    Smokeless tobacco: Current     Types: Chew   Substance Use Topics    Alcohol use: Yes     Alcohol/week: 0.0 standard drinks of alcohol     Comment: Alcoholic Drinks/day: socially        Allergies   Allergen Reactions    Iron Rash     Metals on his skin     Current Outpatient Medications   Medication Sig Dispense Refill    atorvastatin (LIPITOR) 20 MG tablet Start with 1 tablet daily by mouth for 2-4 weeks then increase to 2 tablets daily if tolerating 180 tablet 3    doxycycline hyclate (VIBRA-TABS) 100 MG tablet Take 1 tablet (100 mg) by mouth 2 times daily. 20 tablet 0    rizatriptan (MAXALT) 5 MG tablet Take 1 tablet (5 mg) by mouth at onset of headache for migraine. May repeat in 2 hours. Max 6 tablets/24 hours. Max 20 per month 20 tablet 3    semaglutide (OZEMPIC) 2 MG/3ML pen Inject 0.25 mg subcutaneously every 7 days. 3 mL 0     Current Facility-Administered Medications   Medication Dose Route Frequency Provider  Last Rate Last Admin    testosterone undecanoate (AVEED) injection 750 mg  750 mg Intramuscular Once Raz Cabrera MD               Objective    /80   Pulse 104   Temp 97.2  F (36.2  C) (Tympanic)   Resp 16   SpO2 98%   There is no height or weight on file to calculate BMI.  Physical Exam   General: Pleasant, in no apparent distress.  Skin: No jaundice, pallor, rashes, or lesions.  Psych: Appropriate mood and affect.      Signed Electronically by: Melissa Irwin PA-C

## 2025-04-28 ENCOUNTER — OFFICE VISIT (OUTPATIENT)
Dept: FAMILY MEDICINE | Facility: OTHER | Age: 45
End: 2025-04-28
Attending: PHYSICIAN ASSISTANT
Payer: COMMERCIAL

## 2025-04-28 VITALS
HEART RATE: 104 BPM | SYSTOLIC BLOOD PRESSURE: 130 MMHG | TEMPERATURE: 97.2 F | OXYGEN SATURATION: 98 % | DIASTOLIC BLOOD PRESSURE: 80 MMHG | RESPIRATION RATE: 16 BRPM

## 2025-04-28 DIAGNOSIS — E11.9 TYPE 2 DIABETES MELLITUS WITHOUT COMPLICATION, WITHOUT LONG-TERM CURRENT USE OF INSULIN (H): Primary | ICD-10-CM

## 2025-04-28 DIAGNOSIS — R51.9 CHRONIC NONINTRACTABLE HEADACHE, UNSPECIFIED HEADACHE TYPE: ICD-10-CM

## 2025-04-28 DIAGNOSIS — R79.89 ELEVATED FERRITIN: ICD-10-CM

## 2025-04-28 DIAGNOSIS — R74.8 ELEVATED LIVER ENZYMES: ICD-10-CM

## 2025-04-28 DIAGNOSIS — M54.2 CHRONIC NECK PAIN: ICD-10-CM

## 2025-04-28 DIAGNOSIS — G89.29 CHRONIC NONINTRACTABLE HEADACHE, UNSPECIFIED HEADACHE TYPE: ICD-10-CM

## 2025-04-28 DIAGNOSIS — E78.2 MIXED HYPERLIPIDEMIA: ICD-10-CM

## 2025-04-28 DIAGNOSIS — G89.29 CHRONIC NECK PAIN: ICD-10-CM

## 2025-04-28 RX ORDER — ATORVASTATIN CALCIUM 20 MG/1
TABLET, FILM COATED ORAL
Qty: 180 TABLET | Refills: 3 | Status: SHIPPED | OUTPATIENT
Start: 2025-04-28

## 2025-04-28 RX ORDER — RIZATRIPTAN BENZOATE 5 MG/1
5 TABLET ORAL
Qty: 20 TABLET | Refills: 3 | Status: SHIPPED | OUTPATIENT
Start: 2025-04-28

## 2025-04-28 ASSESSMENT — PATIENT HEALTH QUESTIONNAIRE - PHQ9
SUM OF ALL RESPONSES TO PHQ QUESTIONS 1-9: 12
10. IF YOU CHECKED OFF ANY PROBLEMS, HOW DIFFICULT HAVE THESE PROBLEMS MADE IT FOR YOU TO DO YOUR WORK, TAKE CARE OF THINGS AT HOME, OR GET ALONG WITH OTHER PEOPLE: SOMEWHAT DIFFICULT
SUM OF ALL RESPONSES TO PHQ QUESTIONS 1-9: 12

## 2025-04-28 ASSESSMENT — PAIN SCALES - GENERAL: PAINLEVEL_OUTOF10: MODERATE PAIN (4)

## 2025-04-28 NOTE — NURSING NOTE
"Chief Complaint   Patient presents with    Treatment Plan     Diabetes and recent lab results     Establish Care       Initial /80   Pulse 104   Temp 97.2  F (36.2  C) (Tympanic)   Resp 16   SpO2 98%  Estimated body mass index is 31.91 kg/m  as calculated from the following:    Height as of 2/7/20: 1.803 m (5' 11\").    Weight as of 4/23/25: 103.8 kg (228 lb 12.8 oz).  Medication Review: complete    The next two questions are to help us understand your food security.  If you are feeling you need any assistance in this area, we have resources available to support you today.           No data to display                  Health Care Directive:  Patient does not have a Health Care Directive: Discussed advance care planning with patient; however, patient declined at this time.    Rhea uPlido LPN      "

## 2025-06-02 NOTE — PROGRESS NOTES
Assessment & Plan     Type 2 diabetes mellitus without complication, without long-term current use of insulin (H)  Recent diagnosis with significant elevated A1c at 10.9.  Started on Ozempic and tolerated well.  Minimal side effects.  Will increase to 1 mg once weekly as below.  Patient will contact pharmacy and insurance to see if there is a way for better coverage and/or an alternative formulary option as he had to pay reportedly nearly $1000 despite insurance coverage for this injection.  Offered to have home glucose monitoring kit which patient declined.  Recheck labs in end of July.  Continue to focus on healthy lifestyle best as able.  - Semaglutide, 1 MG/DOSE, (OZEMPIC) 4 MG/3ML pen; Inject 1 mg subcutaneously every 7 days.    Mixed hyperlipidemia  Tolerating statin well.  Will recheck fasting labs at 3-month check.  - Semaglutide, 1 MG/DOSE, (OZEMPIC) 4 MG/3ML pen; Inject 1 mg subcutaneously every 7 days.          Laxmi Kim is a 44 year old, presenting for the following health issues:  Follow Up (Diabetes)    History of Present Illness       Diabetes:   He presents for follow up of diabetes.    He is not checking blood glucose.        He is concerned about other.    He is not experiencing numbness or burning in feet, excessive thirst, blurry vision, weight changes or redness, sores or blisters on feet. The patient has not had a diabetic eye exam in the last 12 months.          He eats 2-3 servings of fruits and vegetables daily.He consumes 0 sweetened beverage(s) daily.He exercises with enough effort to increase his heart rate 20 to 29 minutes per day.  He exercises with enough effort to increase his heart rate 4 days per week. He is missing 1 dose(s) of medications per week.      Here for follow-up on new diagnosis of diabetes.  Patient had presented to the clinic end of April for significant fatigue.  Lab work at that time showing significantly elevated A1c at 10.9, elevated lipids, elevated  liver enzymes as well as elevated ferritin.  We followed up to discuss and decided on starting Ozempic for diabetic management and weight as well as statin for coverage of diabetes and hyperlipidemia.  Has been tolerating statin well up to atorvastatin 40 mg once daily without side effects.  Tolerating Ozempic well.  Started with 0.25 mg and increase to 0.5.  Has not noticed much for weight improvement.  Reports he does have concerns with the price of the medication.  Reports it was run through his insurance and he still had to pay nearly $1000 for his first prescription.  Has not been monitoring blood sugar at home, reports he is not interested in any glucose meter at home.  Has been making changes with increasing physical activity as well as improving diet.  Significantly limiting energy drinks, sugary drinks as well as improving food choices.      PAST MEDICAL HISTORY:   Past Medical History:   Diagnosis Date    Anxiety disorder     2/3/2016    Endocrine disorder     2/3/2016    Major depressive disorder, single episode, moderate (H)     2/3/2016       PAST SURGICAL HISTORY:   Past Surgical History:   Procedure Laterality Date    ARTHROSCOPY KNEE      No Comments Provided    OTHER SURGICAL HISTORY      [id number],OTHER    OTHER SURGICAL HISTORY      ,,OTHER,Bilateral,due to gynecomastia       FAMILY HISTORY:   Family History   Problem Relation Age of Onset    Diabetes Father         Diabetes    Hypertension Father         Hypertension    Other - See Comments Mother         No Known Problems    Skin Cancer Sister         Skin cancer    Anxiety Disorder Sister         Anxiety disorder       SOCIAL HISTORY:   Social History     Tobacco Use    Smoking status: Former     Current packs/day: 0.00     Types: Cigarettes     Quit date: 2024     Years since quittin.1    Smokeless tobacco: Current     Types: Chew   Substance Use Topics    Alcohol use: Yes     Alcohol/week: 0.0 standard drinks of alcohol      Comment: Alcoholic Drinks/day: socially        Allergies   Allergen Reactions    Iron Rash     Metals on his skin     Current Outpatient Medications   Medication Sig Dispense Refill    atorvastatin (LIPITOR) 20 MG tablet Start with 1 tablet daily by mouth for 2-4 weeks then increase to 2 tablets daily if tolerating 180 tablet 3    rizatriptan (MAXALT) 5 MG tablet Take 1 tablet (5 mg) by mouth at onset of headache for migraine. May repeat in 2 hours. Max 6 tablets/24 hours. Max 20 per month 20 tablet 3    semaglutide (OZEMPIC) 2 MG/3ML pen Inject 0.25 mg subcutaneously every 7 days. 3 mL 0    Semaglutide, 1 MG/DOSE, (OZEMPIC) 4 MG/3ML pen Inject 1 mg subcutaneously every 7 days. 3 mL 0    doxycycline hyclate (VIBRA-TABS) 100 MG tablet Take 1 tablet (100 mg) by mouth 2 times daily. (Patient not taking: Reported on 6/3/2025) 20 tablet 0     Current Facility-Administered Medications   Medication Dose Route Frequency Provider Last Rate Last Admin    testosterone undecanoate (AVEED) injection 750 mg  750 mg Intramuscular Once Raz Cabrera MD                 Objective    /78   Pulse 87   Temp 97.5  F (36.4  C) (Tympanic)   Resp 18   Wt 102.7 kg (226 lb 6.4 oz)   SpO2 97%   BMI 31.58 kg/m    Body mass index is 31.58 kg/m .  Physical Exam   General: Pleasant, in no apparent distress.  Eyes: Sclera are white and conjunctiva are clear bilaterally. Lacrimal apparatus free of erythema, edema, and discharge bilaterally.  Ears: External ears without erythema or edema.   Nose: External nose is symmetrical and free of lesions and deformities.   Skin: No jaundice, pallor, rashes, or lesions.  Psych: Appropriate mood and affect.      Signed Electronically by: Melissa Irwin PA-C

## 2025-06-03 ENCOUNTER — OFFICE VISIT (OUTPATIENT)
Dept: FAMILY MEDICINE | Facility: OTHER | Age: 45
End: 2025-06-03
Attending: PHYSICIAN ASSISTANT
Payer: COMMERCIAL

## 2025-06-03 VITALS
OXYGEN SATURATION: 97 % | SYSTOLIC BLOOD PRESSURE: 134 MMHG | DIASTOLIC BLOOD PRESSURE: 78 MMHG | BODY MASS INDEX: 31.58 KG/M2 | WEIGHT: 226.4 LBS | HEART RATE: 87 BPM | TEMPERATURE: 97.5 F | RESPIRATION RATE: 18 BRPM

## 2025-06-03 DIAGNOSIS — E11.9 TYPE 2 DIABETES MELLITUS WITHOUT COMPLICATION, WITHOUT LONG-TERM CURRENT USE OF INSULIN (H): Primary | ICD-10-CM

## 2025-06-03 DIAGNOSIS — E78.2 MIXED HYPERLIPIDEMIA: ICD-10-CM

## 2025-06-03 ASSESSMENT — PAIN SCALES - GENERAL: PAINLEVEL_OUTOF10: NO PAIN (0)

## 2025-06-03 NOTE — NURSING NOTE
"Chief Complaint   Patient presents with    Follow Up     Diabetes       Initial /78   Pulse 87   Temp 97.5  F (36.4  C) (Tympanic)   Resp 18   Wt 102.7 kg (226 lb 6.4 oz)   SpO2 97%   BMI 31.58 kg/m   Estimated body mass index is 31.58 kg/m  as calculated from the following:    Height as of 2/7/20: 1.803 m (5' 11\").    Weight as of this encounter: 102.7 kg (226 lb 6.4 oz).  Medication Review: complete    The next two questions are to help us understand your food security.  If you are feeling you need any assistance in this area, we have resources available to support you today.           No data to display                  Health Care Directive:  Patient does not have a Health Care Directive: Discussed advance care planning with patient; however, patient declined at this time.    Rhea Pulido LPN      "

## 2025-07-03 DIAGNOSIS — E11.9 TYPE 2 DIABETES MELLITUS WITHOUT COMPLICATION, WITHOUT LONG-TERM CURRENT USE OF INSULIN (H): ICD-10-CM

## 2025-07-03 DIAGNOSIS — E78.2 MIXED HYPERLIPIDEMIA: ICD-10-CM

## 2025-07-07 ENCOUNTER — TELEPHONE (OUTPATIENT)
Dept: FAMILY MEDICINE | Facility: OTHER | Age: 45
End: 2025-07-07
Payer: COMMERCIAL

## 2025-07-07 RX ORDER — SEMAGLUTIDE 1.34 MG/ML
1 INJECTION, SOLUTION SUBCUTANEOUS
Qty: 3 ML | Refills: 0 | Status: SHIPPED | OUTPATIENT
Start: 2025-07-07

## 2025-07-07 NOTE — TELEPHONE ENCOUNTER
[facility] sent Rx request for the following:      Requested Prescriptions   Pending Prescriptions Disp Refills    OZEMPIC (1 MG/DOSE) 4 MG/3ML pen [Pharmacy Med Name: OZEMPIC 1MG PER DOSE (4MG/3ML) PFP] 3 mL 0     Sig: INJECT 1 MG UNDER THE SKIN EVERY 7 DAYS       GLP-1 Agonists Protocol Passed - 7/7/2025  4:49 PM     Last Prescription Date:   06/03/25  Last Fill Qty/Refills:         3 mL, R-0    Last Office Visit:              06/03/25 (Dc)   Future Office visit:            None.     Prescription refilled per RN Medication Refill Policy.................... Esperanza Rosales RN ....................  7/7/2025   4:53 PM

## 2025-07-07 NOTE — TELEPHONE ENCOUNTER
Reason for call: Medication or medication refill    Have you contacted your pharmacy regarding this refill? yes    If No, direct the patient to contact the Pharmacy and discontinue telephone note using Erroneous Encounter.  If Yes, continue.    Name of medication requested: OZempic    How many days of medication do you have left? zero    What pharmacy do you use? [facility]    Preferred method for responding to this message: Telephone Call    Phone number patient can be reached at: Home number on file [phone] (home)    If we cannot reach you directly, may we leave a detailed response at the number you provided? Alla Fuller on 7/7/2025 at 3:39 PM

## 2025-07-19 ENCOUNTER — HEALTH MAINTENANCE LETTER (OUTPATIENT)
Age: 45
End: 2025-07-19

## 2025-07-21 ENCOUNTER — OFFICE VISIT (OUTPATIENT)
Dept: FAMILY MEDICINE | Facility: OTHER | Age: 45
End: 2025-07-21
Attending: NURSE PRACTITIONER
Payer: COMMERCIAL

## 2025-07-21 ENCOUNTER — HOSPITAL ENCOUNTER (OUTPATIENT)
Dept: GENERAL RADIOLOGY | Facility: OTHER | Age: 45
Discharge: HOME | End: 2025-07-21
Attending: NURSE PRACTITIONER
Payer: COMMERCIAL

## 2025-07-21 VITALS
BODY MASS INDEX: 31.08 KG/M2 | DIASTOLIC BLOOD PRESSURE: 74 MMHG | SYSTOLIC BLOOD PRESSURE: 130 MMHG | HEIGHT: 71 IN | HEART RATE: 98 BPM | TEMPERATURE: 98.7 F | RESPIRATION RATE: 19 BRPM | OXYGEN SATURATION: 95 % | WEIGHT: 222 LBS

## 2025-07-21 DIAGNOSIS — M79.675 PAIN OF TOE OF LEFT FOOT: Primary | ICD-10-CM

## 2025-07-21 DIAGNOSIS — M79.671 CHRONIC FOOT PAIN, RIGHT: ICD-10-CM

## 2025-07-21 DIAGNOSIS — G89.29 CHRONIC FOOT PAIN, RIGHT: ICD-10-CM

## 2025-07-21 DIAGNOSIS — S99.922A TOE INJURY, LEFT, INITIAL ENCOUNTER: ICD-10-CM

## 2025-07-21 PROCEDURE — 73660 X-RAY EXAM OF TOE(S): CPT | Mod: 26 | Performed by: RADIOLOGY

## 2025-07-21 PROCEDURE — 73660 X-RAY EXAM OF TOE(S): CPT | Mod: LT

## 2025-07-21 PROCEDURE — 73630 X-RAY EXAM OF FOOT: CPT | Mod: RT

## 2025-07-21 PROCEDURE — 73630 X-RAY EXAM OF FOOT: CPT | Mod: 26 | Performed by: RADIOLOGY

## 2025-07-21 ASSESSMENT — PATIENT HEALTH QUESTIONNAIRE - PHQ9: SUM OF ALL RESPONSES TO PHQ QUESTIONS 1-9: 0

## 2025-07-21 ASSESSMENT — PAIN SCALES - GENERAL: PAINLEVEL_OUTOF10: MODERATE PAIN (6)

## 2025-07-21 NOTE — PROGRESS NOTES
ASSESSMENT/PLAN:     I have reviewed the nursing notes.  I have reviewed the findings, diagnosis, plan and need for follow up with the patient.          1. Pain of toe of left foot (Primary)  2. Toe injury, left, initial encounter  - XR Toe Left G/E 2 Views    X-ray of left toe completed and personally reviewed without appreciated abnormality, radiologist overread:  No fracture or dislocation is identified. The joint spaces are preserved.      X-ray images and report reviewed during visit with patient.  Weightbearing as tolerated.  Activity as tolerated.  Patient declines postop shoe.  Recommend supportive footwear  Recommend ice  May use over-the-counter Tylenol or ibuprofen PRN  Follow up if symptoms persist or worsen or concerns    3. Chronic foot pain, right  - XR Foot Right G/E 3 Views    X-ray of right foot completed and personally reviewed, no appreciated abnormality, radiologist overread:  No fracture or dislocation is identified. The joint spaces are preserved.    X-ray images and report reviewed during visit with patient.  Weightbearing as tolerated.  Activity as tolerated.  Recommend supportive footwear  Recommend ice  May use over-the-counter Tylenol or ibuprofen PRN  Follow up if symptoms persist or worsen or concerns      I explained my diagnostic considerations and recommendations to the patient, who voiced understanding and agreement with the treatment plan. All questions were answered. We discussed potential side effects of any prescribed or recommended therapies, as well as expectations for response to treatments.    Jennifer Maldonado NP  [facility]      SUBJECTIVE:   Julio Trent is a 44 year old male who presents to clinic today for the following health issues:  Toe injury    HPI  Patient injured his left second toe 2 days ago while playing volleyball and landing on it wrong.  Patient with swelling, bruising and pain to his left second toe.  He is concerned for a fracture  "and is requesting x-ray.  Patient also reports he has repetitively broken his right great toe and/or his right foot multiple times in the past.  He states he has had some lingering pain over the past year and would like this lara-rayed today.  No numbness or tingling in bilateral lower extremities.  No icing.  No OTC medications.  No use of supportive footwear, patient wearing flip-flops.        Past Medical History:   Diagnosis Date    Anxiety disorder     2/3/2016    Endocrine disorder     2/3/2016    Major depressive disorder, single episode, moderate (H)     2/3/2016     Past Surgical History:   Procedure Laterality Date    ARTHROSCOPY KNEE      No Comments Provided    OTHER SURGICAL HISTORY      [id number],OTHER    OTHER SURGICAL HISTORY      ,,OTHER,Bilateral,due to gynecomastia     Social History     Tobacco Use    Smoking status: Former     Current packs/day: 0.00     Types: Cigarettes     Quit date: 2024     Years since quittin.2    Smokeless tobacco: Current     Types: Chew   Substance Use Topics    Alcohol use: Yes     Alcohol/week: 0.0 standard drinks of alcohol     Comment: Alcoholic Drinks/day: socially     Current Outpatient Medications   Medication Sig Dispense Refill    atorvastatin (LIPITOR) 20 MG tablet Start with 1 tablet daily by mouth for 2-4 weeks then increase to 2 tablets daily if tolerating 180 tablet 3    OZEMPIC, 1 MG/DOSE, 4 MG/3ML pen INJECT 1 MG UNDER THE SKIN EVERY 7 DAYS 3 mL 0    rizatriptan (MAXALT) 5 MG tablet Take 1 tablet (5 mg) by mouth at onset of headache for migraine. May repeat in 2 hours. Max 6 tablets/24 hours. Max 20 per month 20 tablet 3     Allergies   Allergen Reactions    Iron Rash     Metals on his skin         Past medical history, past surgical history, current medications and allergies reviewed and accurate to the best of my knowledge.        OBJECTIVE:     /74   Pulse 98   Temp 98.7  F (37.1  C) (Tympanic)   Resp 19   Ht 1.803 m (5' 11\")  "  Wt 100.7 kg (222 lb)   SpO2 95%   BMI 30.96 kg/m    Body mass index is 30.96 kg/m .      Physical Exam  General Appearance: Well appearing adult male, appropriate appearance for age. No acute distress  Cardiac: CMS intact to bilateral lower extremities with brisk capillary refill and strong palpable pedal pulses.  No lower extremity edema.  Musculoskeletal: Left second toe with noted swelling with tenderness to palpation.  Able to wiggle left toes.  Left foot and ankle without swelling or tenderness.  Right foot without noted swelling, no tenderness to palpation.  Able to wiggle right toes.  Right foot and ankle without swelling or tenderness.  Mildly limping gait due to left toe pain.  Dermatological: Skin intact to bilateral lower extremities including toes, feet and ankles.  Left second toe with mild bruising present.  Psychological: normal affect, alert, oriented, and pleasant.     Imaging:  Results for orders placed or performed in visit on 07/21/25   XR Toe Left G/E 2 Views     Status: None    Narrative    PROCEDURE:  XR TOE LEFT G/E 2 VIEWS    HISTORY: Pain of toe of left foot; Toe injury, left, initial encounter    COMPARISON:  None.    TECHNIQUE:  3 views of the left second toe were obtained.    FINDINGS:  No fracture or dislocation is identified. The joint spaces  are preserved.        Impression    IMPRESSION: No acute fracture.      RUBÉN WHITTEN MD         SYSTEM ID:  [id number]   XR Foot Right G/E 3 Views     Status: None    Narrative    PROCEDURE:  XR FOOT RIGHT G/E 3 VIEWS    HISTORY: Chronic foot pain, right; Chronic foot pain, right    COMPARISON:  None.    TECHNIQUE:  3 views of the right foot were obtained.    FINDINGS:  No fracture or dislocation is identified. The joint spaces  are preserved.        Impression    IMPRESSION: Normal right foot      RUBÉN WHITTEN MD         SYSTEM ID:  [id number]

## 2025-07-21 NOTE — NURSING NOTE
"Chief Complaint   Patient presents with    Toe Pain     Patient here for second digit on left foot x2 days after playing volleyball and landing on it wrong.     Patient would also like right foot xrayed around right big toe after an injury that happened 1+ years ago.     Initial /74   Pulse 98   Temp 98.7  F (37.1  C) (Tympanic)   Resp 19   Ht 1.803 m (5' 11\")   Wt 100.7 kg (222 lb)   SpO2 95%   BMI 30.96 kg/m   Estimated body mass index is 30.96 kg/m  as calculated from the following:    Height as of this encounter: 1.803 m (5' 11\").    Weight as of this encounter: 100.7 kg (222 lb).  Medication Review: complete    The next two questions are to help us understand your food security.  If you are feeling you need any assistance in this area, we have resources available to support you today.          7/21/2025   SDOH- Food Insecurity   Within the past 12 months, did you worry that your food would run out before you got money to buy more? N   Within the past 12 months, did the food you bought just not last and you didn t have money to get more? N         Health Care Directive:  Patient does not have a Health Care Directive: Discussed advance care planning with patient; however, patient declined at this time.    Emma Lamas LPN      "
23-Aug-2020 20:02

## 2025-08-02 DIAGNOSIS — E78.2 MIXED HYPERLIPIDEMIA: ICD-10-CM

## 2025-08-02 DIAGNOSIS — E11.9 TYPE 2 DIABETES MELLITUS WITHOUT COMPLICATION, WITHOUT LONG-TERM CURRENT USE OF INSULIN (H): ICD-10-CM

## 2025-08-04 RX ORDER — SEMAGLUTIDE 1.34 MG/ML
1 INJECTION, SOLUTION SUBCUTANEOUS
Qty: 3 ML | Refills: 0 | Status: SHIPPED | OUTPATIENT
Start: 2025-08-04

## 2025-08-09 ENCOUNTER — HEALTH MAINTENANCE LETTER (OUTPATIENT)
Age: 45
End: 2025-08-09

## 2025-09-02 ENCOUNTER — TELEPHONE (OUTPATIENT)
Dept: FAMILY MEDICINE | Facility: OTHER | Age: 45
End: 2025-09-02
Payer: COMMERCIAL

## (undated) RX ORDER — CYCLOBENZAPRINE HCL 10 MG
TABLET ORAL
Status: DISPENSED
Start: 2022-04-13

## (undated) RX ORDER — KETOROLAC TROMETHAMINE 30 MG/ML
INJECTION, SOLUTION INTRAMUSCULAR; INTRAVENOUS
Status: DISPENSED
Start: 2022-04-13

## (undated) RX ORDER — METHYLPREDNISOLONE ACETATE 80 MG/ML
INJECTION, SUSPENSION INTRA-ARTICULAR; INTRALESIONAL; INTRAMUSCULAR; SOFT TISSUE
Status: DISPENSED
Start: 2019-07-12

## (undated) RX ORDER — METHYLPREDNISOLONE ACETATE 80 MG/ML
INJECTION, SUSPENSION INTRA-ARTICULAR; INTRALESIONAL; INTRAMUSCULAR; SOFT TISSUE
Status: DISPENSED
Start: 2019-02-13

## (undated) RX ORDER — SODIUM CHLORIDE 9 MG/ML
INJECTION, SOLUTION INTRAVENOUS
Status: DISPENSED
Start: 2022-04-13

## (undated) RX ORDER — LIDOCAINE HYDROCHLORIDE 10 MG/ML
INJECTION, SOLUTION INFILTRATION; PERINEURAL
Status: DISPENSED
Start: 2019-02-13

## (undated) RX ORDER — BETAMETHASONE SODIUM PHOSPHATE AND BETAMETHASONE ACETATE 3; 3 MG/ML; MG/ML
INJECTION, SUSPENSION INTRA-ARTICULAR; INTRALESIONAL; INTRAMUSCULAR; SOFT TISSUE
Status: DISPENSED
Start: 2018-10-24

## (undated) RX ORDER — LIDOCAINE HYDROCHLORIDE 10 MG/ML
INJECTION, SOLUTION INFILTRATION; PERINEURAL
Status: DISPENSED
Start: 2019-07-12

## (undated) RX ORDER — LIDOCAINE HYDROCHLORIDE 10 MG/ML
INJECTION, SOLUTION INFILTRATION; PERINEURAL
Status: DISPENSED
Start: 2018-10-24

## (undated) RX ORDER — ACETAMINOPHEN 500 MG
TABLET ORAL
Status: DISPENSED
Start: 2022-04-13